# Patient Record
Sex: FEMALE | Race: WHITE | NOT HISPANIC OR LATINO | Employment: OTHER | URBAN - NONMETROPOLITAN AREA
[De-identification: names, ages, dates, MRNs, and addresses within clinical notes are randomized per-mention and may not be internally consistent; named-entity substitution may affect disease eponyms.]

---

## 2017-01-30 ENCOUNTER — TELEPHONE (OUTPATIENT)
Dept: FAMILY MEDICINE CLINIC | Facility: CLINIC | Age: 72
End: 2017-01-30

## 2017-01-30 NOTE — TELEPHONE ENCOUNTER
Patient left a voice message on the prescription refill line, requesting refills on 3 medication but didn't leave the names of med just the pharmacy refill numbers. Have tried returning patients call but received no answer, will try again.

## 2017-01-31 RX ORDER — ATORVASTATIN CALCIUM 20 MG/1
20 TABLET, FILM COATED ORAL NIGHTLY
Qty: 30 TABLET | Refills: 0 | Status: SHIPPED | OUTPATIENT
Start: 2017-01-31 | End: 2017-03-06 | Stop reason: SDUPTHER

## 2017-01-31 RX ORDER — MELOXICAM 15 MG/1
15 TABLET ORAL DAILY
Qty: 30 TABLET | Refills: 0 | Status: SHIPPED | OUTPATIENT
Start: 2017-01-31 | End: 2017-03-06 | Stop reason: SDUPTHER

## 2017-01-31 RX ORDER — GABAPENTIN 300 MG/1
300 CAPSULE ORAL 3 TIMES DAILY
Qty: 90 CAPSULE | Refills: 0 | Status: SHIPPED | OUTPATIENT
Start: 2017-01-31 | End: 2017-03-06 | Stop reason: SDUPTHER

## 2017-02-28 ENCOUNTER — LAB (OUTPATIENT)
Dept: LAB | Facility: OTHER | Age: 72
End: 2017-02-28

## 2017-02-28 DIAGNOSIS — E78.5 HYPERLIPIDEMIA: ICD-10-CM

## 2017-02-28 LAB
ALBUMIN SERPL-MCNC: 4 G/DL (ref 3.2–5.5)
ALBUMIN/GLOB SERPL: 1.3 G/DL (ref 1–3)
ALP SERPL-CCNC: 56 U/L (ref 15–121)
ALT SERPL W P-5'-P-CCNC: 14 U/L (ref 10–60)
ANION GAP SERPL CALCULATED.3IONS-SCNC: 7 MMOL/L (ref 5–15)
AST SERPL-CCNC: 19 U/L (ref 10–60)
BACTERIA UR QL AUTO: ABNORMAL /HPF
BASOPHILS # BLD AUTO: 0.07 10*3/MM3 (ref 0–0.2)
BASOPHILS NFR BLD AUTO: 1.1 % (ref 0–2)
BILIRUB SERPL-MCNC: 1 MG/DL (ref 0.2–1)
BILIRUB UR QL STRIP: NEGATIVE
BUN BLD-MCNC: 22 MG/DL (ref 8–25)
BUN/CREAT SERPL: 31.4 (ref 7–25)
CALCIUM SPEC-SCNC: 9.8 MG/DL (ref 8.4–10.8)
CHLORIDE SERPL-SCNC: 108 MMOL/L (ref 100–112)
CHOLEST SERPL-MCNC: 163 MG/DL (ref 150–200)
CLARITY UR: ABNORMAL
CO2 SERPL-SCNC: 28 MMOL/L (ref 20–32)
COLOR UR: YELLOW
CREAT BLD-MCNC: 0.7 MG/DL (ref 0.4–1.3)
DEPRECATED RDW RBC AUTO: 40.9 FL (ref 36.4–46.3)
EOSINOPHIL # BLD AUTO: 0.24 10*3/MM3 (ref 0–0.7)
EOSINOPHIL NFR BLD AUTO: 3.7 % (ref 0–7)
ERYTHROCYTE [DISTWIDTH] IN BLOOD BY AUTOMATED COUNT: 13.4 % (ref 11.5–14.5)
GFR SERPL CREATININE-BSD FRML MDRD: 82 ML/MIN/1.73 (ref 39–90)
GLOBULIN UR ELPH-MCNC: 3.2 GM/DL (ref 2.5–4.6)
GLUCOSE BLD-MCNC: 98 MG/DL (ref 70–100)
GLUCOSE UR STRIP-MCNC: NEGATIVE MG/DL
HCT VFR BLD AUTO: 41.7 % (ref 35–45)
HDLC SERPL-MCNC: 55 MG/DL (ref 35–100)
HGB BLD-MCNC: 13.3 G/DL (ref 12–15.5)
HGB UR QL STRIP.AUTO: ABNORMAL
HYALINE CASTS UR QL AUTO: ABNORMAL /LPF
KETONES UR QL STRIP: ABNORMAL
LDLC SERPL CALC-MCNC: 90 MG/DL
LDLC/HDLC SERPL: 1.63 {RATIO}
LEUKOCYTE ESTERASE UR QL STRIP.AUTO: ABNORMAL
LYMPHOCYTES # BLD AUTO: 1.57 10*3/MM3 (ref 0.6–4.2)
LYMPHOCYTES NFR BLD AUTO: 23.9 % (ref 10–50)
MCH RBC QN AUTO: 27.1 PG (ref 26.5–34)
MCHC RBC AUTO-ENTMCNC: 31.9 G/DL (ref 31.4–36)
MCV RBC AUTO: 84.9 FL (ref 80–98)
MONOCYTES # BLD AUTO: 0.57 10*3/MM3 (ref 0–0.9)
MONOCYTES NFR BLD AUTO: 8.7 % (ref 0–12)
MUCOUS THREADS URNS QL MICRO: ABNORMAL /HPF
NEUTROPHILS # BLD AUTO: 4.12 10*3/MM3 (ref 2–8.6)
NEUTROPHILS NFR BLD AUTO: 62.6 % (ref 37–80)
NITRITE UR QL STRIP: NEGATIVE
PH UR STRIP.AUTO: 5.5 [PH] (ref 5.5–8)
PLATELET # BLD AUTO: 223 10*3/MM3 (ref 150–450)
PMV BLD AUTO: 11.4 FL (ref 8–12)
POTASSIUM BLD-SCNC: 4.3 MMOL/L (ref 3.4–5.4)
PROT SERPL-MCNC: 7.2 G/DL (ref 6.7–8.2)
PROT UR QL STRIP: ABNORMAL
RBC # BLD AUTO: 4.91 10*6/MM3 (ref 3.77–5.16)
RBC # UR: ABNORMAL /HPF
REF LAB TEST METHOD: ABNORMAL
SODIUM BLD-SCNC: 143 MMOL/L (ref 134–146)
SP GR UR STRIP: >=1.03 (ref 1–1.03)
SQUAMOUS #/AREA URNS HPF: ABNORMAL /HPF
TRIGL SERPL-MCNC: 92 MG/DL (ref 35–160)
UROBILINOGEN UR QL STRIP: ABNORMAL
VLDLC SERPL-MCNC: 18.4 MG/DL
WBC NRBC COR # BLD: 6.57 10*3/MM3 (ref 3.2–9.8)
WBC UR QL AUTO: ABNORMAL /HPF

## 2017-02-28 PROCEDURE — 36415 COLL VENOUS BLD VENIPUNCTURE: CPT | Performed by: INTERNAL MEDICINE

## 2017-02-28 PROCEDURE — 80053 COMPREHEN METABOLIC PANEL: CPT | Performed by: INTERNAL MEDICINE

## 2017-02-28 PROCEDURE — 80061 LIPID PANEL: CPT | Performed by: INTERNAL MEDICINE

## 2017-02-28 PROCEDURE — 84443 ASSAY THYROID STIM HORMONE: CPT | Performed by: INTERNAL MEDICINE

## 2017-02-28 PROCEDURE — 87086 URINE CULTURE/COLONY COUNT: CPT | Performed by: INTERNAL MEDICINE

## 2017-02-28 PROCEDURE — 84439 ASSAY OF FREE THYROXINE: CPT | Performed by: INTERNAL MEDICINE

## 2017-02-28 PROCEDURE — 85025 COMPLETE CBC W/AUTO DIFF WBC: CPT | Performed by: INTERNAL MEDICINE

## 2017-02-28 PROCEDURE — 81001 URINALYSIS AUTO W/SCOPE: CPT | Performed by: INTERNAL MEDICINE

## 2017-03-01 LAB
T4 FREE SERPL-MCNC: 1.18 NG/DL (ref 0.78–2.19)
TSH SERPL DL<=0.05 MIU/L-ACNC: 1.74 MIU/ML (ref 0.46–4.68)

## 2017-03-02 LAB — BACTERIA SPEC AEROBE CULT: NORMAL

## 2017-03-06 ENCOUNTER — OFFICE VISIT (OUTPATIENT)
Dept: FAMILY MEDICINE CLINIC | Facility: CLINIC | Age: 72
End: 2017-03-06

## 2017-03-06 VITALS
TEMPERATURE: 98.4 F | DIASTOLIC BLOOD PRESSURE: 88 MMHG | BODY MASS INDEX: 27 KG/M2 | HEART RATE: 88 BPM | WEIGHT: 168 LBS | HEIGHT: 66 IN | SYSTOLIC BLOOD PRESSURE: 136 MMHG

## 2017-03-06 DIAGNOSIS — E78.2 MIXED HYPERLIPIDEMIA: Primary | Chronic | ICD-10-CM

## 2017-03-06 DIAGNOSIS — M15.9 PRIMARY OSTEOARTHRITIS INVOLVING MULTIPLE JOINTS: Chronic | ICD-10-CM

## 2017-03-06 DIAGNOSIS — G90.9 PERIPHERAL AUTONOMIC NEUROPATHY: Chronic | ICD-10-CM

## 2017-03-06 PROCEDURE — 99214 OFFICE O/P EST MOD 30 MIN: CPT | Performed by: INTERNAL MEDICINE

## 2017-03-06 RX ORDER — GABAPENTIN 300 MG/1
300 CAPSULE ORAL 3 TIMES DAILY
Qty: 90 CAPSULE | Refills: 5 | Status: SHIPPED | OUTPATIENT
Start: 2017-03-06 | End: 2017-05-12 | Stop reason: SDUPTHER

## 2017-03-06 RX ORDER — MELOXICAM 15 MG/1
15 TABLET ORAL DAILY
Qty: 30 TABLET | Refills: 5 | Status: SHIPPED | OUTPATIENT
Start: 2017-03-06 | End: 2017-05-12 | Stop reason: SDUPTHER

## 2017-03-06 RX ORDER — ATORVASTATIN CALCIUM 20 MG/1
20 TABLET, FILM COATED ORAL NIGHTLY
Qty: 30 TABLET | Refills: 5 | Status: SHIPPED | OUTPATIENT
Start: 2017-03-06 | End: 2017-05-12 | Stop reason: SDUPTHER

## 2017-03-06 NOTE — PROGRESS NOTES
Subjective   Eusebia Braden is a 71 y.o. female who presents to the office for follow-up and review of labs. She has hyperlipidemia and takes Lipitor daily.  She has osteoarthritis which causes low back pain and takes meloxicam daily.  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  The numbness and tingling radiates into the lower extremities bilaterally.       History of Present Illness     The following portions of the patient's history were reviewed and updated as appropriate: allergies, current medications, past family history, past medical history, past social history, past surgical history and problem list.    Review of Systems   Constitutional: Negative for chills, fatigue and fever.   HENT: Negative for congestion, sneezing, sore throat and trouble swallowing.    Eyes: Negative for visual disturbance.   Respiratory: Negative for cough, chest tightness, shortness of breath and wheezing.    Cardiovascular: Negative for chest pain, palpitations and leg swelling.   Gastrointestinal: Negative for abdominal pain, constipation, diarrhea, nausea and vomiting.   Genitourinary: Negative for dysuria, frequency and urgency.   Musculoskeletal: Negative for neck pain.   Skin: Negative for rash.   Neurological: Negative for dizziness, weakness and headaches.   Psychiatric/Behavioral:        Patient denies any feelings of depression and has not felt down, hopeless or lost interest in any activities.       Objective   Physical Exam   Constitutional: She is oriented to person, place, and time. She appears well-developed and well-nourished.   HENT:   Head: Normocephalic and atraumatic.   Nose: Nose normal.   Mouth/Throat: Oropharynx is clear and moist. No oropharyngeal exudate.   Eyes: Conjunctivae and EOM are normal. Pupils are equal, round, and reactive to light. Right eye exhibits no discharge. Left eye exhibits no discharge. No scleral icterus.   Neck: Normal range of  motion. Neck supple. No thyromegaly present.   Cardiovascular: Normal rate, regular rhythm, normal heart sounds and intact distal pulses.  Exam reveals no gallop and no friction rub.    No murmur heard.  Pulmonary/Chest: Effort normal and breath sounds normal. No respiratory distress. She has no wheezes. She has no rales.   Abdominal: Soft. Bowel sounds are normal. She exhibits no distension. There is no tenderness. There is no rebound and no guarding.   Musculoskeletal: She exhibits no edema.        Lumbar back: She exhibits pain.   Lymphadenopathy:     She has no cervical adenopathy.   Neurological: She is alert and oriented to person, place, and time. No cranial nerve deficit.   Skin: Skin is warm and dry. No rash noted.   Psychiatric: She has a normal mood and affect. Her behavior is normal. Judgment and thought content normal.   Nursing note and vitals reviewed.      Assessment/Plan   Eusebia was seen today for hyperlipidemia.    Diagnoses and all orders for this visit:    Mixed hyperlipidemia  -     CBC & Differential; Future  -     Comprehensive Metabolic Panel; Future  -     LDL Cholesterol, Direct; Future  -     T4, Free; Future  -     TSH; Future  -     Urinalysis With / Culture If Indicated; Future  -     atorvastatin (LIPITOR) 20 MG tablet; Take 1 tablet by mouth Every Night.    Peripheral autonomic neuropathy  -     gabapentin (NEURONTIN) 300 MG capsule; Take 1 capsule by mouth 3 (Three) Times a Day.    Primary osteoarthritis involving multiple joints  -     meloxicam (MOBIC) 15 MG tablet; Take 1 tablet by mouth Daily.         Labs are reviewed with patient. Her lipids are at goal and she will continue with Lipitor.  She will continue with meloxicam for treatment of the osteoarthritis.  She will continue with Neurontin for treatment of the neuropathic pain.  She may use Claritin over-the-counter for the allergic rhinitis.    Patients BMI indicates that she is overweight.  I discussed the importance of  weight loss, and have recommended a diet and exercise regimen.      PHQ-9 Depression Screening 3/6/2017   Little interest or pleasure in doing things 0   Feeling down, depressed, or hopeless 1   Trouble falling or staying asleep, or sleeping too much 1   Feeling tired or having little energy 1   Poor appetite or overeating 1   Feeling bad about yourself - or that you are a failure or have let yourself or your family down 0   Trouble concentrating on things, such as reading the newspaper or watching television 0   Moving or speaking so slowly that other people could have noticed. Or the opposite - being so fidgety or restless that you have been moving around a lot more than usual 0   Thoughts that you would be better off dead, or of hurting yourself in some way 0   PHQ-9 Total Score 4   If you checked off any problems, how difficult have these problems made it for you to do your work, take care of things at home, or get along with other people? Not difficult at all         Lab on 02/28/2017   Component Date Value Ref Range Status   • Glucose 02/28/2017 98  70 - 100 mg/dL Final   • BUN 02/28/2017 22  8 - 25 mg/dL Final   • Creatinine 02/28/2017 0.70  0.40 - 1.30 mg/dL Final   • Sodium 02/28/2017 143  134 - 146 mmol/L Final   • Potassium 02/28/2017 4.3  3.4 - 5.4 mmol/L Final   • Chloride 02/28/2017 108  100 - 112 mmol/L Final   • CO2 02/28/2017 28.0  20.0 - 32.0 mmol/L Final   • Calcium 02/28/2017 9.8  8.4 - 10.8 mg/dL Final   • Total Protein 02/28/2017 7.2  6.7 - 8.2 g/dL Final   • Albumin 02/28/2017 4.00  3.20 - 5.50 g/dL Final   • ALT (SGPT) 02/28/2017 14  10 - 60 U/L Final   • AST (SGOT) 02/28/2017 19  10 - 60 U/L Final   • Alkaline Phosphatase 02/28/2017 56  15 - 121 U/L Final   • Total Bilirubin 02/28/2017 1.0  0.2 - 1.0 mg/dL Final   • eGFR Non African Amer 02/28/2017 82  39 - 90 mL/min/1.73 Final   • Globulin 02/28/2017 3.2  2.5 - 4.6 gm/dL Final   • A/G Ratio 02/28/2017 1.3  1.0 - 3.0 g/dL Final   •  BUN/Creatinine Ratio 02/28/2017 31.4* 7.0 - 25.0 Final   • Anion Gap 02/28/2017 7.0  5.0 - 15.0 mmol/L Final   • Total Cholesterol 02/28/2017 163  150 - 200 mg/dL Final   • Triglycerides 02/28/2017 92  35 - 160 mg/dL Final   • HDL Cholesterol 02/28/2017 55  35 - 100 mg/dL Final   • LDL Cholesterol  02/28/2017 90  mg/dL Final   • VLDL Cholesterol 02/28/2017 18.4  mg/dL Final   • LDL/HDL Ratio 02/28/2017 1.63   Final   • Free T4 02/28/2017 1.18  0.78 - 2.19 ng/dL Final   • TSH 02/28/2017 1.740  0.460 - 4.680 mIU/mL Final   • Color, UA 02/28/2017 Yellow  Yellow, Straw Final   • Appearance, UA 02/28/2017 Cloudy* Clear Final   • pH, UA 02/28/2017 5.5  5.5 - 8.0 Final   • Specific Gravity, UA 02/28/2017 >=1.030  1.005 - 1.030 Final   • Glucose, UA 02/28/2017 Negative  Negative Final   • Ketones, UA 02/28/2017 Trace* Negative Final   • Bilirubin, UA 02/28/2017 Negative  Negative Final   • Blood, UA 02/28/2017 Trace* Negative Final   • Protein, UA 02/28/2017 30 mg/dL (1+)* Negative Final   • Leuk Esterase, UA 02/28/2017 Moderate (2+)* Negative Final   • Nitrite, UA 02/28/2017 Negative  Negative Final   • Urobilinogen, UA 02/28/2017 0.2 E.U./dL  0.2 - 1.0 E.U./dL Final   • WBC 02/28/2017 6.57  3.20 - 9.80 10*3/mm3 Final   • RBC 02/28/2017 4.91  3.77 - 5.16 10*6/mm3 Final   • Hemoglobin 02/28/2017 13.3  12.0 - 15.5 g/dL Final   • Hematocrit 02/28/2017 41.7  35.0 - 45.0 % Final   • MCV 02/28/2017 84.9  80.0 - 98.0 fL Final   • MCH 02/28/2017 27.1  26.5 - 34.0 pg Final   • MCHC 02/28/2017 31.9  31.4 - 36.0 g/dL Final   • RDW 02/28/2017 13.4  11.5 - 14.5 % Final   • RDW-SD 02/28/2017 40.9  36.4 - 46.3 fl Final   • MPV 02/28/2017 11.4  8.0 - 12.0 fL Final   • Platelets 02/28/2017 223  150 - 450 10*3/mm3 Final   • Neutrophil % 02/28/2017 62.6  37.0 - 80.0 % Final   • Lymphocyte % 02/28/2017 23.9  10.0 - 50.0 % Final   • Monocyte % 02/28/2017 8.7  0.0 - 12.0 % Final   • Eosinophil % 02/28/2017 3.7  0.0 - 7.0 % Final   • Basophil %  02/28/2017 1.1  0.0 - 2.0 % Final   • Neutrophils, Absolute 02/28/2017 4.12  2.00 - 8.60 10*3/mm3 Final   • Lymphocytes, Absolute 02/28/2017 1.57  0.60 - 4.20 10*3/mm3 Final   • Monocytes, Absolute 02/28/2017 0.57  0.00 - 0.90 10*3/mm3 Final   • Eosinophils, Absolute 02/28/2017 0.24  0.00 - 0.70 10*3/mm3 Final   • Basophils, Absolute 02/28/2017 0.07  0.00 - 0.20 10*3/mm3 Final   • RBC, UA 02/28/2017 3-5* None Seen /HPF Final   • WBC, UA 02/28/2017 Too Numerous to Count* None Seen /HPF Final   • Bacteria, UA 02/28/2017 2+* None Seen /HPF Final   • Squamous Epithelial Cells, UA 02/28/2017 21-30* None Seen, 0-2 /HPF Final   • Hyaline Casts, UA 02/28/2017 None Seen  None Seen /LPF Final   • Mucus, UA 02/28/2017 Moderate/2+* None Seen, Trace /HPF Final   • Methodology 02/28/2017 Manual Light Microscopy   Final   • Urine Culture 02/28/2017 No growth at 24 hours   Final   ]

## 2017-03-06 NOTE — PATIENT INSTRUCTIONS

## 2017-05-12 DIAGNOSIS — M15.9 PRIMARY OSTEOARTHRITIS INVOLVING MULTIPLE JOINTS: Chronic | ICD-10-CM

## 2017-05-12 DIAGNOSIS — G90.9 PERIPHERAL AUTONOMIC NEUROPATHY: Chronic | ICD-10-CM

## 2017-05-12 DIAGNOSIS — E78.2 MIXED HYPERLIPIDEMIA: Chronic | ICD-10-CM

## 2017-05-12 RX ORDER — MELOXICAM 15 MG/1
15 TABLET ORAL DAILY
Qty: 90 TABLET | Refills: 3 | Status: SHIPPED | OUTPATIENT
Start: 2017-05-12 | End: 2018-03-13 | Stop reason: SDUPTHER

## 2017-05-12 RX ORDER — GABAPENTIN 300 MG/1
300 CAPSULE ORAL 3 TIMES DAILY
Qty: 270 CAPSULE | Refills: 3 | Status: SHIPPED | OUTPATIENT
Start: 2017-05-12 | End: 2017-09-12

## 2017-05-12 RX ORDER — ATORVASTATIN CALCIUM 20 MG/1
20 TABLET, FILM COATED ORAL NIGHTLY
Qty: 90 TABLET | Refills: 3 | Status: SHIPPED | OUTPATIENT
Start: 2017-05-12 | End: 2018-03-13 | Stop reason: SDUPTHER

## 2017-05-31 ENCOUNTER — OFFICE VISIT (OUTPATIENT)
Dept: FAMILY MEDICINE CLINIC | Facility: CLINIC | Age: 72
End: 2017-05-31

## 2017-05-31 VITALS
TEMPERATURE: 97.8 F | BODY MASS INDEX: 25.71 KG/M2 | DIASTOLIC BLOOD PRESSURE: 82 MMHG | RESPIRATION RATE: 17 BRPM | OXYGEN SATURATION: 95 % | SYSTOLIC BLOOD PRESSURE: 132 MMHG | WEIGHT: 160 LBS | HEIGHT: 66 IN | HEART RATE: 71 BPM
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
## 2017-05-31 DIAGNOSIS — M25.532 WRIST PAIN, LEFT: ICD-10-CM

## 2017-05-31 DIAGNOSIS — M25.532 LEFT WRIST PAIN: Primary | ICD-10-CM

## 2017-05-31 PROCEDURE — 99214 OFFICE O/P EST MOD 30 MIN: CPT | Performed by: NURSE PRACTITIONER

## 2017-09-07 ENCOUNTER — LAB (OUTPATIENT)
Dept: LAB | Facility: OTHER | Age: 72
End: 2017-09-07

## 2017-09-07 DIAGNOSIS — E78.2 MIXED HYPERLIPIDEMIA: ICD-10-CM

## 2017-09-07 LAB
ALBUMIN SERPL-MCNC: 3.9 G/DL (ref 3.2–5.5)
ALBUMIN/GLOB SERPL: 1.3 G/DL (ref 1–3)
ALP SERPL-CCNC: 59 U/L (ref 15–121)
ALT SERPL W P-5'-P-CCNC: 11 U/L (ref 10–60)
ANION GAP SERPL CALCULATED.3IONS-SCNC: 9 MMOL/L (ref 5–15)
ARTICHOKE IGE QN: 84 MG/DL (ref 0–129)
AST SERPL-CCNC: 18 U/L (ref 10–60)
BACTERIA UR QL AUTO: ABNORMAL /HPF
BASOPHILS # BLD AUTO: 0.04 10*3/MM3 (ref 0–0.2)
BASOPHILS NFR BLD AUTO: 0.5 % (ref 0–2)
BILIRUB SERPL-MCNC: 1.1 MG/DL (ref 0.2–1)
BILIRUB UR QL STRIP: NEGATIVE
BUN BLD-MCNC: 18 MG/DL (ref 8–25)
BUN/CREAT SERPL: 25.7 (ref 7–25)
CALCIUM SPEC-SCNC: 9.4 MG/DL (ref 8.4–10.8)
CHLORIDE SERPL-SCNC: 104 MMOL/L (ref 100–112)
CLARITY UR: CLEAR
CO2 SERPL-SCNC: 28 MMOL/L (ref 20–32)
COLOR UR: YELLOW
CREAT BLD-MCNC: 0.7 MG/DL (ref 0.4–1.3)
DEPRECATED RDW RBC AUTO: 41 FL (ref 36.4–46.3)
EOSINOPHIL # BLD AUTO: 0.23 10*3/MM3 (ref 0–0.7)
EOSINOPHIL NFR BLD AUTO: 3 % (ref 0–7)
ERYTHROCYTE [DISTWIDTH] IN BLOOD BY AUTOMATED COUNT: 13.2 % (ref 11.5–14.5)
GFR SERPL CREATININE-BSD FRML MDRD: 82 ML/MIN/1.73 (ref 39–90)
GLOBULIN UR ELPH-MCNC: 3.1 GM/DL (ref 2.5–4.6)
GLUCOSE BLD-MCNC: 104 MG/DL (ref 70–100)
GLUCOSE UR STRIP-MCNC: NEGATIVE MG/DL
HCT VFR BLD AUTO: 41 % (ref 35–45)
HGB BLD-MCNC: 13.1 G/DL (ref 12–15.5)
HGB UR QL STRIP.AUTO: NEGATIVE
HYALINE CASTS UR QL AUTO: ABNORMAL /LPF
KETONES UR QL STRIP: NEGATIVE
LEUKOCYTE ESTERASE UR QL STRIP.AUTO: ABNORMAL
LYMPHOCYTES # BLD AUTO: 1.63 10*3/MM3 (ref 0.6–4.2)
LYMPHOCYTES NFR BLD AUTO: 21.6 % (ref 10–50)
MCH RBC QN AUTO: 27.5 PG (ref 26.5–34)
MCHC RBC AUTO-ENTMCNC: 32 G/DL (ref 31.4–36)
MCV RBC AUTO: 86.1 FL (ref 80–98)
MONOCYTES # BLD AUTO: 0.63 10*3/MM3 (ref 0–0.9)
MONOCYTES NFR BLD AUTO: 8.3 % (ref 0–12)
NEUTROPHILS # BLD AUTO: 5.03 10*3/MM3 (ref 2–8.6)
NEUTROPHILS NFR BLD AUTO: 66.6 % (ref 37–80)
NITRITE UR QL STRIP: NEGATIVE
PH UR STRIP.AUTO: 7 [PH] (ref 5.5–8)
PLATELET # BLD AUTO: 227 10*3/MM3 (ref 150–450)
PMV BLD AUTO: 10.7 FL (ref 8–12)
POTASSIUM BLD-SCNC: 5 MMOL/L (ref 3.4–5.4)
PROT SERPL-MCNC: 7 G/DL (ref 6.7–8.2)
PROT UR QL STRIP: ABNORMAL
RBC # BLD AUTO: 4.76 10*6/MM3 (ref 3.77–5.16)
RBC # UR: ABNORMAL /HPF
REF LAB TEST METHOD: ABNORMAL
SODIUM BLD-SCNC: 141 MMOL/L (ref 134–146)
SP GR UR STRIP: 1.02 (ref 1–1.03)
SQUAMOUS #/AREA URNS HPF: ABNORMAL /HPF
T4 FREE SERPL-MCNC: 1.01 NG/DL (ref 0.78–2.19)
TSH SERPL DL<=0.05 MIU/L-ACNC: 1.42 MIU/ML (ref 0.46–4.68)
UROBILINOGEN UR QL STRIP: ABNORMAL
WBC NRBC COR # BLD: 7.56 10*3/MM3 (ref 3.2–9.8)
WBC UR QL AUTO: ABNORMAL /HPF

## 2017-09-07 PROCEDURE — 87086 URINE CULTURE/COLONY COUNT: CPT | Performed by: INTERNAL MEDICINE

## 2017-09-07 PROCEDURE — 84443 ASSAY THYROID STIM HORMONE: CPT | Performed by: INTERNAL MEDICINE

## 2017-09-07 PROCEDURE — 81001 URINALYSIS AUTO W/SCOPE: CPT | Performed by: INTERNAL MEDICINE

## 2017-09-07 PROCEDURE — 83721 ASSAY OF BLOOD LIPOPROTEIN: CPT | Performed by: INTERNAL MEDICINE

## 2017-09-07 PROCEDURE — 85025 COMPLETE CBC W/AUTO DIFF WBC: CPT | Performed by: INTERNAL MEDICINE

## 2017-09-07 PROCEDURE — 80053 COMPREHEN METABOLIC PANEL: CPT | Performed by: INTERNAL MEDICINE

## 2017-09-07 PROCEDURE — 84439 ASSAY OF FREE THYROXINE: CPT | Performed by: INTERNAL MEDICINE

## 2017-09-07 PROCEDURE — 36415 COLL VENOUS BLD VENIPUNCTURE: CPT | Performed by: INTERNAL MEDICINE

## 2017-09-08 LAB
BACTERIA SPEC AEROBE CULT: NORMAL
BACTERIA SPEC AEROBE CULT: NORMAL

## 2017-09-12 ENCOUNTER — OFFICE VISIT (OUTPATIENT)
Dept: FAMILY MEDICINE CLINIC | Facility: CLINIC | Age: 72
End: 2017-09-12

## 2017-09-12 VITALS
HEIGHT: 66 IN | DIASTOLIC BLOOD PRESSURE: 80 MMHG | WEIGHT: 158 LBS | HEART RATE: 70 BPM | BODY MASS INDEX: 25.39 KG/M2 | SYSTOLIC BLOOD PRESSURE: 120 MMHG

## 2017-09-12 DIAGNOSIS — Z23 PNEUMOCOCCAL VACCINATION GIVEN: ICD-10-CM

## 2017-09-12 DIAGNOSIS — E78.2 MIXED HYPERLIPIDEMIA: Primary | Chronic | ICD-10-CM

## 2017-09-12 DIAGNOSIS — G90.9 PERIPHERAL AUTONOMIC NEUROPATHY: Chronic | ICD-10-CM

## 2017-09-12 DIAGNOSIS — M15.9 PRIMARY OSTEOARTHRITIS INVOLVING MULTIPLE JOINTS: Chronic | ICD-10-CM
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
## 2017-09-12 PROBLEM — M25.532 WRIST PAIN, LEFT: Status: RESOLVED | Noted: 2017-05-31 | Resolved: 2017-09-12

## 2017-09-12 PROCEDURE — 99214 OFFICE O/P EST MOD 30 MIN: CPT | Performed by: INTERNAL MEDICINE

## 2017-09-12 PROCEDURE — 90670 PCV13 VACCINE IM: CPT | Performed by: INTERNAL MEDICINE

## 2017-09-12 PROCEDURE — G0009 ADMIN PNEUMOCOCCAL VACCINE: HCPCS | Performed by: INTERNAL MEDICINE

## 2017-09-12 RX ORDER — GABAPENTIN 300 MG/1
300 CAPSULE ORAL 4 TIMES DAILY
Qty: 120 CAPSULE | Refills: 0
Start: 2017-09-12 | End: 2017-11-20 | Stop reason: SDUPTHER

## 2017-09-12 NOTE — PROGRESS NOTES
[facility]#   [id number]                        .

## 2017-09-12 NOTE — PROGRESS NOTES
"Chief Complaint   Patient presents with   • Hyperlipidemia   • Back Pain     rad to left leg     Subjective   Eusebia Braden is a 72 y.o. female who presents to the office for follow-up and review of labs. he has hyperlipidemia and takes Lipitor daily.  She has osteoarthritis which causes low back pain and takes meloxicam daily.  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  The numbness and tingling radiates into the lower extremities bilaterally. This has been stable. When she starts having increasing numbness and pain in her hips, she stands up and walks.  This typically helps quite a bit.       The following portions of the patient's history were reviewed and updated as appropriate: allergies, current medications, past family history, past medical history, past social history, past surgical history and problem list.    Review of Systems   Constitutional: Negative for chills, fatigue and fever.   HENT: Negative for congestion, sneezing, sore throat and trouble swallowing.    Eyes: Negative for visual disturbance.   Respiratory: Negative for cough, chest tightness, shortness of breath and wheezing.    Cardiovascular: Negative for chest pain, palpitations and leg swelling.   Gastrointestinal: Negative for abdominal pain, constipation, diarrhea, nausea and vomiting.   Genitourinary: Negative for dysuria, frequency and urgency.   Musculoskeletal: Positive for arthralgias and back pain. Negative for neck pain.   Skin: Negative for rash.   Neurological: Negative for dizziness, weakness and headaches.   Psychiatric/Behavioral:        Patient denies any feelings of depression and has not felt down, hopeless or lost interest in any activities.       Objective   Vitals:    09/12/17 1041   BP: 120/80   BP Location: Left arm   Patient Position: Sitting   Cuff Size: Adult   Pulse: 70   Weight: 158 lb (71.7 kg)   Height: 66\" (167.6 cm)   PainSc: 5  Comment: bilat knees and " episodes of  back pain   PainLoc: Knee     Physical Exam   Constitutional: She is oriented to person, place, and time. She appears well-developed and well-nourished.   HENT:   Head: Normocephalic and atraumatic.   Nose: Nose normal.   Mouth/Throat: Oropharynx is clear and moist. No oropharyngeal exudate.   Eyes: Conjunctivae and EOM are normal. Pupils are equal, round, and reactive to light. Right eye exhibits no discharge. Left eye exhibits no discharge. No scleral icterus.   Neck: Normal range of motion. Neck supple. No thyromegaly present.   Cardiovascular: Normal rate, regular rhythm, normal heart sounds and intact distal pulses.  Exam reveals no gallop and no friction rub.    No murmur heard.  Pulmonary/Chest: Effort normal and breath sounds normal. No respiratory distress. She has no wheezes. She has no rales.   Abdominal: Soft. Bowel sounds are normal. She exhibits no distension. There is no tenderness. There is no rebound and no guarding.   Musculoskeletal: She exhibits no edema.        Lumbar back: She exhibits pain.   Lymphadenopathy:     She has no cervical adenopathy.   Neurological: She is alert and oriented to person, place, and time. No cranial nerve deficit.   Skin: Skin is warm and dry. No rash noted.   Psychiatric: She has a normal mood and affect. Her behavior is normal. Judgment and thought content normal.   Nursing note and vitals reviewed.      Assessment/Plan   Eusebia was seen today for hyperlipidemia and back pain.    Diagnoses and all orders for this visit:    Mixed hyperlipidemia  -     CBC & Differential; Future  -     Comprehensive Metabolic Panel; Future  -     Lipid Panel; Future  -     T4, Free; Future  -     TSH; Future  -     Urinalysis With / Culture If Indicated; Future    Primary osteoarthritis involving multiple joints    Peripheral autonomic neuropathy  -     gabapentin (NEURONTIN) 300 MG capsule; Take 1 capsule by mouth 4 (Four) Times a Day.    Pneumococcal vaccination given  -      Pneumococcal Conjugate Vaccine 13-Valent All         Labs are reviewed with patient.  Her lipids are at goal and she will continue with Lipitor.  She will continue with meloxicam for treatment of the osteoarthritis.  She will continue with Neurontin for treatment of the neuropathic pain.  She is currently taking 300 mg of Neurontin in the morning and afternoon, and 600 mg at bedtime.    She is given a pneumonia vaccine today.  She does not want a flu shot.     Patient understands the risks associated with this controlled medication, including tolerance and addiction.  Patient also agrees to only obtain this medication from me, and not from a another provider, unless that provider is covering for me in my absence.  Patient also agrees to be compliant in dosing, and not self adjust the dose of medication.  A signed controlled substance agreement is on file, and the patient has received a controlled substance education sheet at this a previous visit.  The patient has also signed a consent for treatment with a controlled substance as per [facility] policy. KESHAWN was obtained.    Depression screen was performed with a score of 12.  She denies any symptoms of depression.  Her positive scores are mostly related to her arthritic pain.    PHQ-2/PHQ-9 Depression Screening 9/12/2017   Little interest or pleasure in doing things 1   Feeling down, depressed, or hopeless 1   Trouble falling or staying asleep, or sleeping too much 2   Feeling tired or having little energy 2   Poor appetite or overeating 1   Feeling bad about yourself - or that you are a failure or have let yourself or your family down 1   Trouble concentrating on things, such as reading the newspaper or watching television 1   Moving or speaking so slowly that other people could have noticed. Or the opposite - being so fidgety or restless that you have been moving around a lot more than usual 2   Thoughts that you would be better off dead, or of hurting  yourself in some way 1   Total Score 12   If you checked off any problems, how difficult have these problems made it for you to do your work, take care of things at home, or get along with other people? Somewhat difficult         Lab on 09/07/2017   Component Date Value Ref Range Status   • Glucose 09/07/2017 104* 70 - 100 mg/dL Final   • BUN 09/07/2017 18  8 - 25 mg/dL Final   • Creatinine 09/07/2017 0.70  0.40 - 1.30 mg/dL Final   • Sodium 09/07/2017 141  134 - 146 mmol/L Final   • Potassium 09/07/2017 5.0  3.4 - 5.4 mmol/L Final   • Chloride 09/07/2017 104  100 - 112 mmol/L Final   • CO2 09/07/2017 28.0  20.0 - 32.0 mmol/L Final   • Calcium 09/07/2017 9.4  8.4 - 10.8 mg/dL Final   • Total Protein 09/07/2017 7.0  6.7 - 8.2 g/dL Final   • Albumin 09/07/2017 3.90  3.20 - 5.50 g/dL Final   • ALT (SGPT) 09/07/2017 11  10 - 60 U/L Final   • AST (SGOT) 09/07/2017 18  10 - 60 U/L Final   • Alkaline Phosphatase 09/07/2017 59  15 - 121 U/L Final   • Total Bilirubin 09/07/2017 1.1* 0.2 - 1.0 mg/dL Final   • eGFR Non African Amer 09/07/2017 82  39 - 90 mL/min/1.73 Final   • Globulin 09/07/2017 3.1  2.5 - 4.6 gm/dL Final   • A/G Ratio 09/07/2017 1.3  1.0 - 3.0 g/dL Final   • BUN/Creatinine Ratio 09/07/2017 25.7* 7.0 - 25.0 Final   • Anion Gap 09/07/2017 9.0  5.0 - 15.0 mmol/L Final   • LDL Cholesterol  09/07/2017 84  0 - 129 mg/dL Final   • Free T4 09/07/2017 1.01  0.78 - 2.19 ng/dL Final   • TSH 09/07/2017 1.420  0.460 - 4.680 mIU/mL Final   • Color, UA 09/07/2017 Yellow  Yellow, Straw Final   • Appearance, UA 09/07/2017 Clear  Clear Final   • pH, UA 09/07/2017 7.0  5.5 - 8.0 Final   • Specific Gravity, UA 09/07/2017 1.020  1.005 - 1.030 Final   • Glucose, UA 09/07/2017 Negative  Negative Final   • Ketones, UA 09/07/2017 Negative  Negative Final   • Bilirubin, UA 09/07/2017 Negative  Negative Final   • Blood, UA 09/07/2017 Negative  Negative Final   • Protein, UA 09/07/2017 30 mg/dL (1+)* Negative Final   • Leuk Esterase, UA  09/07/2017 Small (1+)* Negative Final   • Nitrite, UA 09/07/2017 Negative  Negative Final   • Urobilinogen, UA 09/07/2017 1.0 E.U./dL  0.2 - 1.0 E.U./dL Final   • WBC 09/07/2017 7.56  3.20 - 9.80 10*3/mm3 Final   • RBC 09/07/2017 4.76  3.77 - 5.16 10*6/mm3 Final   • Hemoglobin 09/07/2017 13.1  12.0 - 15.5 g/dL Final   • Hematocrit 09/07/2017 41.0  35.0 - 45.0 % Final   • MCV 09/07/2017 86.1  80.0 - 98.0 fL Final   • MCH 09/07/2017 27.5  26.5 - 34.0 pg Final   • MCHC 09/07/2017 32.0  31.4 - 36.0 g/dL Final   • RDW 09/07/2017 13.2  11.5 - 14.5 % Final   • RDW-SD 09/07/2017 41.0  36.4 - 46.3 fl Final   • MPV 09/07/2017 10.7  8.0 - 12.0 fL Final   • Platelets 09/07/2017 227  150 - 450 10*3/mm3 Final   • Neutrophil % 09/07/2017 66.6  37.0 - 80.0 % Final   • Lymphocyte % 09/07/2017 21.6  10.0 - 50.0 % Final   • Monocyte % 09/07/2017 8.3  0.0 - 12.0 % Final   • Eosinophil % 09/07/2017 3.0  0.0 - 7.0 % Final   • Basophil % 09/07/2017 0.5  0.0 - 2.0 % Final   • Neutrophils, Absolute 09/07/2017 5.03  2.00 - 8.60 10*3/mm3 Final   • Lymphocytes, Absolute 09/07/2017 1.63  0.60 - 4.20 10*3/mm3 Final   • Monocytes, Absolute 09/07/2017 0.63  0.00 - 0.90 10*3/mm3 Final   • Eosinophils, Absolute 09/07/2017 0.23  0.00 - 0.70 10*3/mm3 Final   • Basophils, Absolute 09/07/2017 0.04  0.00 - 0.20 10*3/mm3 Final   • RBC, UA 09/07/2017 0-2* None Seen /HPF Final   • WBC, UA 09/07/2017 3-5* None Seen /HPF Final   • Bacteria, UA 09/07/2017 Trace* None Seen /HPF Final   • Squamous Epithelial Cells, UA 09/07/2017 0-2  None Seen, 0-2 /HPF Final   • Hyaline Casts, UA 09/07/2017 0-2  None Seen /LPF Final   • Methodology 09/07/2017 Manual Light Microscopy   Final   • Urine Culture 09/07/2017 Culture in progress   Final   • Urine Culture 09/07/2017 Mixed Culture   Final   ]

## 2017-11-20 DIAGNOSIS — G90.9 PERIPHERAL AUTONOMIC NEUROPATHY: Chronic | ICD-10-CM

## 2017-11-20 RX ORDER — GABAPENTIN 300 MG/1
CAPSULE ORAL
Qty: 270 CAPSULE | Refills: 0 | Status: SHIPPED | OUTPATIENT
Start: 2017-11-20 | End: 2017-11-20 | Stop reason: SDUPTHER

## 2017-11-20 RX ORDER — GABAPENTIN 300 MG/1
CAPSULE ORAL
Qty: 120 CAPSULE | Refills: 2 | Status: SHIPPED | OUTPATIENT
Start: 2017-11-20 | End: 2018-02-27 | Stop reason: SDUPTHER

## 2017-11-20 NOTE — TELEPHONE ENCOUNTER
Shredded script for Neurontin 300 mg tid.  States taking 4 capsules daily as noted on last visit. Will reprint for 4 capsules daily as requested.

## 2018-02-27 ENCOUNTER — TELEPHONE (OUTPATIENT)
Dept: FAMILY MEDICINE CLINIC | Facility: CLINIC | Age: 73
End: 2018-02-27

## 2018-02-27 DIAGNOSIS — G90.9 PERIPHERAL AUTONOMIC NEUROPATHY: Chronic | ICD-10-CM

## 2018-02-27 RX ORDER — GABAPENTIN 300 MG/1
CAPSULE ORAL
Qty: 120 CAPSULE | Refills: 0 | Status: SHIPPED | OUTPATIENT
Start: 2018-02-27 | End: 2018-03-13 | Stop reason: SDUPTHER

## 2018-02-27 NOTE — TELEPHONE ENCOUNTER
Patient was last seen by  on 09/12/2017 due to follow up w/ again on 03/13/2018. Requesting a refill on the Gabapentin 300 MG, to [facility].

## 2018-03-12 ENCOUNTER — LAB (OUTPATIENT)
Dept: LAB | Facility: OTHER | Age: 73
End: 2018-03-12

## 2018-03-12 DIAGNOSIS — E78.2 MIXED HYPERLIPIDEMIA: ICD-10-CM

## 2018-03-12 LAB
ALBUMIN SERPL-MCNC: 4 G/DL (ref 3.2–5.5)
ALBUMIN/GLOB SERPL: 1.5 G/DL (ref 1–3)
ALP SERPL-CCNC: 54 U/L (ref 15–121)
ALT SERPL W P-5'-P-CCNC: 17 U/L (ref 10–60)
ANION GAP SERPL CALCULATED.3IONS-SCNC: 6 MMOL/L (ref 5–15)
AST SERPL-CCNC: 24 U/L (ref 10–60)
BACTERIA UR QL AUTO: ABNORMAL /HPF
BASOPHILS # BLD AUTO: 0.07 10*3/MM3 (ref 0–0.2)
BASOPHILS NFR BLD AUTO: 1.2 % (ref 0–2)
BILIRUB SERPL-MCNC: 0.8 MG/DL (ref 0.2–1)
BILIRUB UR QL STRIP: NEGATIVE
BUN BLD-MCNC: 19 MG/DL (ref 8–25)
BUN/CREAT SERPL: 31.7 (ref 7–25)
CALCIUM SPEC-SCNC: 9.2 MG/DL (ref 8.4–10.8)
CHLORIDE SERPL-SCNC: 107 MMOL/L (ref 100–112)
CHOLEST SERPL-MCNC: 168 MG/DL (ref 150–200)
CLARITY UR: ABNORMAL
CO2 SERPL-SCNC: 28 MMOL/L (ref 20–32)
COLOR UR: YELLOW
CREAT BLD-MCNC: 0.6 MG/DL (ref 0.4–1.3)
DEPRECATED RDW RBC AUTO: 40.8 FL (ref 36.4–46.3)
EOSINOPHIL # BLD AUTO: 0.24 10*3/MM3 (ref 0–0.7)
EOSINOPHIL NFR BLD AUTO: 4 % (ref 0–7)
ERYTHROCYTE [DISTWIDTH] IN BLOOD BY AUTOMATED COUNT: 13.4 % (ref 11.5–14.5)
GFR SERPL CREATININE-BSD FRML MDRD: 98 ML/MIN/1.73 (ref 39–90)
GLOBULIN UR ELPH-MCNC: 2.7 GM/DL (ref 2.5–4.6)
GLUCOSE BLD-MCNC: 96 MG/DL (ref 70–100)
GLUCOSE UR STRIP-MCNC: NEGATIVE MG/DL
HCT VFR BLD AUTO: 40.4 % (ref 35–45)
HDLC SERPL-MCNC: 69 MG/DL (ref 35–100)
HGB BLD-MCNC: 12.8 G/DL (ref 12–15.5)
HGB UR QL STRIP.AUTO: NEGATIVE
HYALINE CASTS UR QL AUTO: ABNORMAL /LPF
KETONES UR QL STRIP: NEGATIVE
LDLC SERPL CALC-MCNC: 80 MG/DL
LDLC/HDLC SERPL: 1.15 {RATIO}
LEUKOCYTE ESTERASE UR QL STRIP.AUTO: ABNORMAL
LYMPHOCYTES # BLD AUTO: 1.67 10*3/MM3 (ref 0.6–4.2)
LYMPHOCYTES NFR BLD AUTO: 28.1 % (ref 10–50)
MCH RBC QN AUTO: 26.8 PG (ref 26.5–34)
MCHC RBC AUTO-ENTMCNC: 31.7 G/DL (ref 31.4–36)
MCV RBC AUTO: 84.7 FL (ref 80–98)
MONOCYTES # BLD AUTO: 0.5 10*3/MM3 (ref 0–0.9)
MONOCYTES NFR BLD AUTO: 8.4 % (ref 0–12)
MUCOUS THREADS URNS QL MICRO: ABNORMAL /HPF
NEUTROPHILS # BLD AUTO: 3.47 10*3/MM3 (ref 2–8.6)
NEUTROPHILS NFR BLD AUTO: 58.3 % (ref 37–80)
NITRITE UR QL STRIP: NEGATIVE
PH UR STRIP.AUTO: 5.5 [PH] (ref 5.5–8)
PLATELET # BLD AUTO: 189 10*3/MM3 (ref 150–450)
PMV BLD AUTO: 12.4 FL (ref 8–12)
POTASSIUM BLD-SCNC: 3.9 MMOL/L (ref 3.4–5.4)
PROT SERPL-MCNC: 6.7 G/DL (ref 6.7–8.2)
PROT UR QL STRIP: NEGATIVE
RBC # BLD AUTO: 4.77 10*6/MM3 (ref 3.77–5.16)
RBC # UR: ABNORMAL /HPF
REF LAB TEST METHOD: ABNORMAL
SODIUM BLD-SCNC: 141 MMOL/L (ref 134–146)
SP GR UR STRIP: >=1.03 (ref 1–1.03)
SQUAMOUS #/AREA URNS HPF: ABNORMAL /HPF
T4 FREE SERPL-MCNC: 1.05 NG/DL (ref 0.78–2.19)
TRIGL SERPL-MCNC: 97 MG/DL (ref 35–160)
TSH SERPL DL<=0.05 MIU/L-ACNC: 2.74 MIU/ML (ref 0.46–4.68)
UROBILINOGEN UR QL STRIP: ABNORMAL
VLDLC SERPL-MCNC: 19.4 MG/DL
WBC NRBC COR # BLD: 5.95 10*3/MM3 (ref 3.2–9.8)
WBC UR QL AUTO: ABNORMAL /HPF

## 2018-03-12 PROCEDURE — 81001 URINALYSIS AUTO W/SCOPE: CPT | Performed by: INTERNAL MEDICINE

## 2018-03-12 PROCEDURE — 80053 COMPREHEN METABOLIC PANEL: CPT | Performed by: INTERNAL MEDICINE

## 2018-03-12 PROCEDURE — 84439 ASSAY OF FREE THYROXINE: CPT | Performed by: INTERNAL MEDICINE

## 2018-03-12 PROCEDURE — 84443 ASSAY THYROID STIM HORMONE: CPT | Performed by: INTERNAL MEDICINE

## 2018-03-12 PROCEDURE — 85025 COMPLETE CBC W/AUTO DIFF WBC: CPT | Performed by: INTERNAL MEDICINE

## 2018-03-12 PROCEDURE — 80061 LIPID PANEL: CPT | Performed by: INTERNAL MEDICINE

## 2018-03-12 PROCEDURE — 36415 COLL VENOUS BLD VENIPUNCTURE: CPT | Performed by: INTERNAL MEDICINE

## 2018-03-13 ENCOUNTER — OFFICE VISIT (OUTPATIENT)
Dept: FAMILY MEDICINE CLINIC | Facility: CLINIC | Age: 73
End: 2018-03-13

## 2018-03-13 VITALS
HEIGHT: 66 IN | HEART RATE: 72 BPM | SYSTOLIC BLOOD PRESSURE: 136 MMHG | TEMPERATURE: 97.6 F | WEIGHT: 161 LBS | BODY MASS INDEX: 25.88 KG/M2 | DIASTOLIC BLOOD PRESSURE: 80 MMHG

## 2018-03-13 DIAGNOSIS — M15.9 PRIMARY OSTEOARTHRITIS INVOLVING MULTIPLE JOINTS: Chronic | ICD-10-CM

## 2018-03-13 DIAGNOSIS — G90.9 PERIPHERAL AUTONOMIC NEUROPATHY: Chronic | ICD-10-CM

## 2018-03-13 DIAGNOSIS — E78.2 MIXED HYPERLIPIDEMIA: Primary | Chronic | ICD-10-CM

## 2018-03-13 PROCEDURE — 99214 OFFICE O/P EST MOD 30 MIN: CPT | Performed by: INTERNAL MEDICINE

## 2018-03-13 RX ORDER — MELOXICAM 15 MG/1
15 TABLET ORAL DAILY
Qty: 90 TABLET | Refills: 3 | Status: SHIPPED | OUTPATIENT
Start: 2018-03-13 | End: 2019-03-15 | Stop reason: SDUPTHER

## 2018-03-13 RX ORDER — ACETAMINOPHEN 500 MG
1000 TABLET ORAL EVERY 8 HOURS PRN
COMMUNITY

## 2018-03-13 RX ORDER — GABAPENTIN 300 MG/1
CAPSULE ORAL
Qty: 120 CAPSULE | Refills: 5 | Status: SHIPPED | OUTPATIENT
Start: 2018-03-13 | End: 2018-09-14 | Stop reason: SDUPTHER

## 2018-03-13 RX ORDER — ATORVASTATIN CALCIUM 20 MG/1
20 TABLET, FILM COATED ORAL NIGHTLY
Qty: 90 TABLET | Refills: 3 | Status: SHIPPED | OUTPATIENT
Start: 2018-03-13 | End: 2019-03-15 | Stop reason: SDUPTHER

## 2018-03-13 NOTE — PROGRESS NOTES
"Chief Complaint   Patient presents with   • Hyperlipidemia     Subjective   Eusebia Braden is a 72 y.o. female who presents to the office for follow-up and review of labs. She has hyperlipidemia and takes Lipitor daily.  She has osteoarthritis which causes low back pain and takes meloxicam daily.  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  The numbness and tingling radiates into the lower extremities bilaterally. This has been stable.    The following portions of the patient's history were reviewed and updated as appropriate: allergies, current medications, past family history, past medical history, past social history, past surgical history and problem list.    Review of Systems   Constitutional: Negative for chills, fatigue and fever.   HENT: Negative for congestion, sneezing, sore throat and trouble swallowing.    Eyes: Negative for visual disturbance.   Respiratory: Negative for cough, chest tightness, shortness of breath and wheezing.    Cardiovascular: Negative for chest pain, palpitations and leg swelling.   Gastrointestinal: Negative for abdominal pain, constipation, diarrhea, nausea and vomiting.   Genitourinary: Negative for dysuria, frequency and urgency.   Musculoskeletal: Positive for arthralgias and back pain. Negative for neck pain.   Skin: Negative for rash.   Neurological: Negative for dizziness, weakness and headaches.   Psychiatric/Behavioral:        Patient denies any feelings of depression and has not felt down, hopeless or lost interest in any activities.       Objective   Vitals:    03/13/18 1048 03/13/18 1103   BP: 136/80    BP Location: Left arm    Patient Position: Sitting    Cuff Size: Large Adult    Pulse: 72    Temp: 97.6 °F (36.4 °C)    TempSrc: Oral    Weight: 73 kg (161 lb)    Height: 167.6 cm (66\")    PainSc: 6  Comment: left leg worse 6  Comment: right knee   PainLoc: Leg Knee     Physical Exam   Constitutional: She is oriented " to person, place, and time. She appears well-developed and well-nourished.   HENT:   Head: Normocephalic and atraumatic.   Nose: Nose normal.   Mouth/Throat: Oropharynx is clear and moist. No oropharyngeal exudate.   Eyes: Conjunctivae and EOM are normal. Pupils are equal, round, and reactive to light. Right eye exhibits no discharge. Left eye exhibits no discharge. No scleral icterus.   Neck: Normal range of motion. Neck supple. No thyromegaly present.   Cardiovascular: Normal rate, regular rhythm, normal heart sounds and intact distal pulses.  Exam reveals no gallop and no friction rub.    No murmur heard.  Pulmonary/Chest: Effort normal and breath sounds normal. No respiratory distress. She has no wheezes. She has no rales.   Abdominal: Soft. Bowel sounds are normal. She exhibits no distension. There is no tenderness. There is no rebound and no guarding.   Musculoskeletal: She exhibits no edema.        Lumbar back: She exhibits pain.   Lymphadenopathy:     She has no cervical adenopathy.   Neurological: She is alert and oriented to person, place, and time. No cranial nerve deficit.   Skin: Skin is warm and dry. No rash noted.   Psychiatric: She has a normal mood and affect. Her behavior is normal. Judgment and thought content normal.   Nursing note and vitals reviewed.      Assessment/Plan   Eusebia was seen today for hyperlipidemia.    Diagnoses and all orders for this visit:    Mixed hyperlipidemia  -     CBC & Differential; Future  -     Comprehensive Metabolic Panel; Future  -     LDL Cholesterol, Direct; Future  -     T4, Free; Future  -     TSH; Future  -     Urinalysis With / Culture If Indicated - Urine, Clean Catch; Future  -     atorvastatin (LIPITOR) 20 MG tablet; Take 1 tablet by mouth Every Night.    Peripheral autonomic neuropathy  -     gabapentin (NEURONTIN) 300 MG capsule; Take 1 capsule each morning and afternoon, and 2 capsules at bedtime    Primary osteoarthritis involving multiple joints  -      meloxicam (MOBIC) 15 MG tablet; Take 1 tablet by mouth Daily.         Labs are reviewed with patient.  Her lipids are at goal and she will continue with Lipitor.  She will continue with meloxicam for treatment of the osteoarthritis.  She will continue with Neurontin for treatment of the neuropathic pain.  She is currently taking 300 mg of Neurontin in the morning and afternoon, and 600 mg at bedtime.    Patient understands the risks associated with this controlled medication, including tolerance and addiction.  Patient also agrees to only obtain this medication from me, and not from a another provider, unless that provider is covering for me in my absence.  Patient also agrees to be compliant in dosing, and not self adjust the dose of medication.  A signed controlled substance agreement is on file, and the patient has received a controlled substance education sheet at this a previous visit.  The patient has also signed a consent for treatment with a controlled substance as per [facility] policy. KESHAWN was obtained.    PHQ-2/PHQ-9 Depression Screening 3/13/2018   Little interest or pleasure in doing things 0   Feeling down, depressed, or hopeless 0   Trouble falling or staying asleep, or sleeping too much 2   Feeling tired or having little energy 3   Poor appetite or overeating 0   Feeling bad about yourself - or that you are a failure or have let yourself or your family down 0   Trouble concentrating on things, such as reading the newspaper or watching television 0   Moving or speaking so slowly that other people could have noticed. Or the opposite - being so fidgety or restless that you have been moving around a lot more than usual 0   Thoughts that you would be better off dead, or of hurting yourself in some way 0   Total Score 5   If you checked off any problems, how difficult have these problems made it for you to do your work, take care of things at home, or get along with other people? Somewhat difficult          Lab on 03/12/2018   Component Date Value Ref Range Status   • Color, UA 03/12/2018 Yellow  Yellow, Straw Final   • Appearance, UA 03/12/2018 Cloudy* Clear Final   • pH, UA 03/12/2018 5.5  5.5 - 8.0 Final   • Specific Gravity, UA 03/12/2018 >=1.030  1.005 - 1.030 Final   • Glucose, UA 03/12/2018 Negative  Negative Final   • Ketones, UA 03/12/2018 Negative  Negative Final   • Bilirubin, UA 03/12/2018 Negative  Negative Final   • Blood, UA 03/12/2018 Negative  Negative Final   • Protein, UA 03/12/2018 Negative  Negative Final   • Leuk Esterase, UA 03/12/2018 Trace* Negative Final   • Nitrite, UA 03/12/2018 Negative  Negative Final   • Urobilinogen, UA 03/12/2018 0.2 E.U./dL  0.2 - 1.0 E.U./dL Final   • Glucose 03/12/2018 96  70 - 100 mg/dL Final   • BUN 03/12/2018 19  8 - 25 mg/dL Final   • Creatinine 03/12/2018 0.60  0.40 - 1.30 mg/dL Final   • Sodium 03/12/2018 141  134 - 146 mmol/L Final   • Potassium 03/12/2018 3.9  3.4 - 5.4 mmol/L Final   • Chloride 03/12/2018 107  100 - 112 mmol/L Final   • CO2 03/12/2018 28.0  20.0 - 32.0 mmol/L Final   • Calcium 03/12/2018 9.2  8.4 - 10.8 mg/dL Final   • Total Protein 03/12/2018 6.7  6.7 - 8.2 g/dL Final   • Albumin 03/12/2018 4.00  3.20 - 5.50 g/dL Final   • ALT (SGPT) 03/12/2018 17  10 - 60 U/L Final   • AST (SGOT) 03/12/2018 24  10 - 60 U/L Final   • Alkaline Phosphatase 03/12/2018 54  15 - 121 U/L Final   • Total Bilirubin 03/12/2018 0.8  0.2 - 1.0 mg/dL Final   • eGFR Non African Amer 03/12/2018 98* 39 - 90 mL/min/1.73 Final   • Globulin 03/12/2018 2.7  2.5 - 4.6 gm/dL Final   • A/G Ratio 03/12/2018 1.5  1.0 - 3.0 g/dL Final   • BUN/Creatinine Ratio 03/12/2018 31.7* 7.0 - 25.0 Final   • Anion Gap 03/12/2018 6.0  5.0 - 15.0 mmol/L Final   • Total Cholesterol 03/12/2018 168  150 - 200 mg/dL Final   • Triglycerides 03/12/2018 97  35 - 160 mg/dL Final   • HDL Cholesterol 03/12/2018 69  35 - 100 mg/dL Final   • LDL Cholesterol  03/12/2018 80  mg/dL Final   • VLDL  Cholesterol 03/12/2018 19.4  mg/dL Final   • LDL/HDL Ratio 03/12/2018 1.15   Final   • Free T4 03/12/2018 1.05  0.78 - 2.19 ng/dL Final   • TSH 03/12/2018 2.740  0.460 - 4.680 mIU/mL Final   • WBC 03/12/2018 5.95  3.20 - 9.80 10*3/mm3 Final   • RBC 03/12/2018 4.77  3.77 - 5.16 10*6/mm3 Final   • Hemoglobin 03/12/2018 12.8  12.0 - 15.5 g/dL Final   • Hematocrit 03/12/2018 40.4  35.0 - 45.0 % Final   • MCV 03/12/2018 84.7  80.0 - 98.0 fL Final   • MCH 03/12/2018 26.8  26.5 - 34.0 pg Final   • MCHC 03/12/2018 31.7  31.4 - 36.0 g/dL Final   • RDW 03/12/2018 13.4  11.5 - 14.5 % Final   • RDW-SD 03/12/2018 40.8  36.4 - 46.3 fl Final   • MPV 03/12/2018 12.4* 8.0 - 12.0 fL Final   • Platelets 03/12/2018 189  150 - 450 10*3/mm3 Final   • Neutrophil % 03/12/2018 58.3  37.0 - 80.0 % Final   • Lymphocyte % 03/12/2018 28.1  10.0 - 50.0 % Final   • Monocyte % 03/12/2018 8.4  0.0 - 12.0 % Final   • Eosinophil % 03/12/2018 4.0  0.0 - 7.0 % Final   • Basophil % 03/12/2018 1.2  0.0 - 2.0 % Final   • Neutrophils, Absolute 03/12/2018 3.47  2.00 - 8.60 10*3/mm3 Final   • Lymphocytes, Absolute 03/12/2018 1.67  0.60 - 4.20 10*3/mm3 Final   • Monocytes, Absolute 03/12/2018 0.50  0.00 - 0.90 10*3/mm3 Final   • Eosinophils, Absolute 03/12/2018 0.24  0.00 - 0.70 10*3/mm3 Final   • Basophils, Absolute 03/12/2018 0.07  0.00 - 0.20 10*3/mm3 Final   • RBC, UA 03/12/2018 0-2* None Seen /HPF Final   • WBC, UA 03/12/2018 3-5* None Seen /HPF Final   • Bacteria, UA 03/12/2018 Trace* None Seen /HPF Final   • Squamous Epithelial Cells, UA 03/12/2018 0-2  None Seen, 0-2 /HPF Final   • Hyaline Casts, UA 03/12/2018 0-2  None Seen /LPF Final   • Mucus, UA 03/12/2018 Trace  None Seen, Trace /HPF Final   • Methodology 03/12/2018 Manual Light Microscopy   Final   ]

## 2018-03-13 NOTE — PROGRESS NOTES
[facility]#  [id number].

## 2018-09-06 ENCOUNTER — LAB (OUTPATIENT)
Dept: LAB | Facility: OTHER | Age: 73
End: 2018-09-06

## 2018-09-06 DIAGNOSIS — E78.2 MIXED HYPERLIPIDEMIA: ICD-10-CM

## 2018-09-06 LAB
ALBUMIN SERPL-MCNC: 4.1 G/DL (ref 3.5–5)
ALBUMIN/GLOB SERPL: 1.3 G/DL (ref 1.1–1.8)
ALP SERPL-CCNC: 61 U/L (ref 38–126)
ALT SERPL W P-5'-P-CCNC: 13 U/L
ANION GAP SERPL CALCULATED.3IONS-SCNC: 6 MMOL/L (ref 5–15)
ARTICHOKE IGE QN: 63 MG/DL (ref 1–129)
AST SERPL-CCNC: 39 U/L (ref 14–36)
BACTERIA UR QL AUTO: ABNORMAL /HPF
BASOPHILS # BLD AUTO: 0.04 10*3/MM3 (ref 0–0.2)
BASOPHILS NFR BLD AUTO: 0.6 % (ref 0–2)
BILIRUB SERPL-MCNC: 1.1 MG/DL (ref 0.2–1.3)
BILIRUB UR QL STRIP: NEGATIVE
BUN BLD-MCNC: 20 MG/DL (ref 7–17)
BUN/CREAT SERPL: 27.4 (ref 7–25)
CALCIUM SPEC-SCNC: 9.6 MG/DL (ref 8.4–10.2)
CHLORIDE SERPL-SCNC: 109 MMOL/L (ref 98–107)
CLARITY UR: ABNORMAL
CO2 SERPL-SCNC: 27 MMOL/L (ref 22–30)
COLOR UR: YELLOW
CREAT BLD-MCNC: 0.73 MG/DL (ref 0.52–1.04)
DEPRECATED RDW RBC AUTO: 40.2 FL (ref 36.4–46.3)
EOSINOPHIL # BLD AUTO: 0.45 10*3/MM3 (ref 0–0.7)
EOSINOPHIL NFR BLD AUTO: 6.6 % (ref 0–7)
ERYTHROCYTE [DISTWIDTH] IN BLOOD BY AUTOMATED COUNT: 13.1 % (ref 11.5–14.5)
GFR SERPL CREATININE-BSD FRML MDRD: 78 ML/MIN/1.73 (ref 39–90)
GLOBULIN UR ELPH-MCNC: 3.1 GM/DL (ref 2.3–3.5)
GLUCOSE BLD-MCNC: 95 MG/DL (ref 74–99)
GLUCOSE UR STRIP-MCNC: NEGATIVE MG/DL
HCT VFR BLD AUTO: 41.1 % (ref 35–45)
HGB BLD-MCNC: 13.2 G/DL (ref 12–15.5)
HGB UR QL STRIP.AUTO: NEGATIVE
HYALINE CASTS UR QL AUTO: ABNORMAL /LPF
KETONES UR QL STRIP: NEGATIVE
LEUKOCYTE ESTERASE UR QL STRIP.AUTO: ABNORMAL
LYMPHOCYTES # BLD AUTO: 1.55 10*3/MM3 (ref 0.6–4.2)
LYMPHOCYTES NFR BLD AUTO: 22.7 % (ref 10–50)
MCH RBC QN AUTO: 28.1 PG (ref 26.5–34)
MCHC RBC AUTO-ENTMCNC: 32.1 G/DL (ref 31.4–36)
MCV RBC AUTO: 87.4 FL (ref 80–98)
MONOCYTES # BLD AUTO: 0.57 10*3/MM3 (ref 0–0.9)
MONOCYTES NFR BLD AUTO: 8.4 % (ref 0–12)
MUCOUS THREADS URNS QL MICRO: ABNORMAL /HPF
NEUTROPHILS # BLD AUTO: 4.21 10*3/MM3 (ref 2–8.6)
NEUTROPHILS NFR BLD AUTO: 61.7 % (ref 37–80)
NITRITE UR QL STRIP: NEGATIVE
PH UR STRIP.AUTO: 5.5 [PH] (ref 5.5–8)
PLATELET # BLD AUTO: 215 10*3/MM3 (ref 150–450)
PMV BLD AUTO: 10.7 FL (ref 8–12)
POTASSIUM BLD-SCNC: 4.4 MMOL/L (ref 3.4–5)
PROT SERPL-MCNC: 7.2 G/DL (ref 6.3–8.2)
PROT UR QL STRIP: NEGATIVE
RBC # BLD AUTO: 4.7 10*6/MM3 (ref 3.77–5.16)
RBC # UR: ABNORMAL /HPF
REF LAB TEST METHOD: ABNORMAL
SODIUM BLD-SCNC: 142 MMOL/L (ref 137–145)
SP GR UR STRIP: 1.02 (ref 1–1.03)
SQUAMOUS #/AREA URNS HPF: ABNORMAL /HPF
T4 FREE SERPL-MCNC: 1.06 NG/DL (ref 0.78–2.19)
TSH SERPL DL<=0.05 MIU/L-ACNC: 1.11 MIU/ML (ref 0.46–4.68)
UROBILINOGEN UR QL STRIP: ABNORMAL
WBC NRBC COR # BLD: 6.82 10*3/MM3 (ref 3.2–9.8)
WBC UR QL AUTO: ABNORMAL /HPF

## 2018-09-06 PROCEDURE — 83721 ASSAY OF BLOOD LIPOPROTEIN: CPT | Performed by: INTERNAL MEDICINE

## 2018-09-06 PROCEDURE — 84443 ASSAY THYROID STIM HORMONE: CPT | Performed by: INTERNAL MEDICINE

## 2018-09-06 PROCEDURE — 85025 COMPLETE CBC W/AUTO DIFF WBC: CPT | Performed by: INTERNAL MEDICINE

## 2018-09-06 PROCEDURE — 81001 URINALYSIS AUTO W/SCOPE: CPT | Performed by: INTERNAL MEDICINE

## 2018-09-06 PROCEDURE — 36415 COLL VENOUS BLD VENIPUNCTURE: CPT | Performed by: INTERNAL MEDICINE

## 2018-09-06 PROCEDURE — 80053 COMPREHEN METABOLIC PANEL: CPT | Performed by: INTERNAL MEDICINE

## 2018-09-06 PROCEDURE — 84439 ASSAY OF FREE THYROXINE: CPT | Performed by: INTERNAL MEDICINE

## 2018-09-14 ENCOUNTER — OFFICE VISIT (OUTPATIENT)
Dept: FAMILY MEDICINE CLINIC | Facility: CLINIC | Age: 73
End: 2018-09-14

## 2018-09-14 VITALS
HEART RATE: 62 BPM | DIASTOLIC BLOOD PRESSURE: 80 MMHG | HEIGHT: 66 IN | WEIGHT: 158 LBS | SYSTOLIC BLOOD PRESSURE: 128 MMHG | OXYGEN SATURATION: 98 % | TEMPERATURE: 97.8 F | BODY MASS INDEX: 25.39 KG/M2

## 2018-09-14 DIAGNOSIS — G90.9 PERIPHERAL AUTONOMIC NEUROPATHY: Chronic | ICD-10-CM

## 2018-09-14 DIAGNOSIS — E78.2 MIXED HYPERLIPIDEMIA: Primary | Chronic | ICD-10-CM

## 2018-09-14 DIAGNOSIS — M15.9 PRIMARY OSTEOARTHRITIS INVOLVING MULTIPLE JOINTS: Chronic | ICD-10-CM

## 2018-09-14 DIAGNOSIS — Z23 PNEUMOCOCCAL VACCINATION GIVEN: ICD-10-CM

## 2018-09-14 PROCEDURE — 99214 OFFICE O/P EST MOD 30 MIN: CPT | Performed by: INTERNAL MEDICINE

## 2018-09-14 PROCEDURE — G0008 ADMIN INFLUENZA VIRUS VAC: HCPCS | Performed by: INTERNAL MEDICINE

## 2018-09-14 PROCEDURE — 90732 PPSV23 VACC 2 YRS+ SUBQ/IM: CPT | Performed by: INTERNAL MEDICINE

## 2018-09-14 RX ORDER — GABAPENTIN 300 MG/1
CAPSULE ORAL
Qty: 120 CAPSULE | Refills: 5 | Status: SHIPPED | OUTPATIENT
Start: 2018-09-14 | End: 2019-03-15 | Stop reason: SDUPTHER

## 2018-09-14 NOTE — PROGRESS NOTES
[facility]# [id number].

## 2018-12-14 ENCOUNTER — PRIOR AUTHORIZATION (OUTPATIENT)
Dept: FAMILY MEDICINE CLINIC | Facility: CLINIC | Age: 73
End: 2018-12-14

## 2018-12-14 ENCOUNTER — OFFICE VISIT (OUTPATIENT)
Dept: FAMILY MEDICINE CLINIC | Facility: CLINIC | Age: 73
End: 2018-12-14

## 2018-12-14 VITALS
BODY MASS INDEX: 26.2 KG/M2 | HEART RATE: 74 BPM | HEIGHT: 66 IN | DIASTOLIC BLOOD PRESSURE: 84 MMHG | WEIGHT: 163 LBS | TEMPERATURE: 97.4 F | SYSTOLIC BLOOD PRESSURE: 128 MMHG

## 2018-12-14 DIAGNOSIS — F41.1 GENERALIZED ANXIETY DISORDER: ICD-10-CM

## 2018-12-14 DIAGNOSIS — F43.21 GRIEF REACTION: Primary | ICD-10-CM

## 2018-12-14 DIAGNOSIS — Z63.4 BEREAVEMENT DUE TO LIFE EVENT: ICD-10-CM

## 2018-12-14 PROCEDURE — 99214 OFFICE O/P EST MOD 30 MIN: CPT | Performed by: INTERNAL MEDICINE

## 2018-12-14 RX ORDER — CITALOPRAM 10 MG/1
10 TABLET ORAL DAILY
Qty: 30 TABLET | Refills: 5 | Status: SHIPPED | OUTPATIENT
Start: 2018-12-14 | End: 2019-03-15 | Stop reason: SDUPTHER

## 2018-12-14 RX ORDER — LORAZEPAM 0.5 MG/1
0.5 TABLET ORAL EVERY 8 HOURS PRN
Qty: 60 TABLET | Refills: 1 | Status: SHIPPED | OUTPATIENT
Start: 2018-12-14 | End: 2019-03-15 | Stop reason: SDUPTHER

## 2018-12-14 SDOH — SOCIAL STABILITY - SOCIAL INSECURITY: DISSAPEARANCE AND DEATH OF FAMILY MEMBER: Z63.4

## 2018-12-14 NOTE — TELEPHONE ENCOUNTER
Received fax notification from [facility] that a PA is required for the Lorazepam 0.5mg tab.   Submitted via [place] [employer]#[id number]. Received approved, faxed to [facility].

## 2018-12-14 NOTE — PROGRESS NOTES
[facility]#  [id number]

## 2018-12-14 NOTE — PROGRESS NOTES
"Chief Complaint   Patient presents with   • Osteoarthritis     pt also c/o anxiety and insomnia     Subjective   Eusebia Braden is a 73 y.o. female who presents to the office complaining of anxiety.  Her son recently committed suicide.  She has been having frequent crying spells, and difficulty sleeping as she goes through the bereavement process.  She denies any suicidal or homicidal thoughts.  She has a supportive family.  Her 2 daughters have been helping her to cope.  They are planning to take a family trip in the near future to get away from everything.    The following portions of the patient's history were reviewed and updated as appropriate: allergies, current medications, past family history, past medical history, past social history, past surgical history and problem list.    Review of Systems   Constitutional: Negative for chills, fatigue and fever.   HENT: Negative for congestion, sneezing, sore throat and trouble swallowing.    Eyes: Negative for visual disturbance.   Respiratory: Negative for cough, chest tightness, shortness of breath and wheezing.    Cardiovascular: Negative for chest pain, palpitations and leg swelling.   Gastrointestinal: Negative for abdominal pain, constipation, diarrhea, nausea and vomiting.   Genitourinary: Negative for dysuria, frequency and urgency.   Musculoskeletal: Positive for arthralgias and back pain. Negative for neck pain.   Skin: Negative for rash.   Neurological: Negative for dizziness, weakness and headaches.   Psychiatric/Behavioral: Positive for sleep disturbance. The patient is nervous/anxious.         Patient denies any feelings of depression and has not felt down, hopeless or lost interest in any activities.       Objective   Vitals:    12/14/18 0901   BP: 128/84   BP Location: Left arm   Patient Position: Sitting   Cuff Size: Adult   Pulse: 74   Temp: 97.4 °F (36.3 °C)   TempSrc: Oral   Weight: 73.9 kg (163 lb)   Height: 167.6 cm (66\")   PainSc:   5 "   PainLoc: Knee  Comment: bilat knee     Physical Exam   Constitutional: She is oriented to person, place, and time. She appears well-developed and well-nourished.   HENT:   Head: Normocephalic and atraumatic.   Nose: Nose normal.   Mouth/Throat: Oropharynx is clear and moist. No oropharyngeal exudate.   Eyes: Conjunctivae and EOM are normal. Pupils are equal, round, and reactive to light. Right eye exhibits no discharge. Left eye exhibits no discharge. No scleral icterus.   Neck: Normal range of motion. Neck supple. No thyromegaly present.   Cardiovascular: Normal rate, regular rhythm, normal heart sounds and intact distal pulses. Exam reveals no gallop and no friction rub.   No murmur heard.  Pulmonary/Chest: Effort normal and breath sounds normal. No respiratory distress. She has no wheezes. She has no rales.   Abdominal: Soft. Bowel sounds are normal. She exhibits no distension. There is no tenderness. There is no rebound and no guarding.   Musculoskeletal: She exhibits no edema.        Lumbar back: She exhibits pain.   Lymphadenopathy:     She has no cervical adenopathy.   Neurological: She is alert and oriented to person, place, and time. No cranial nerve deficit.   Skin: Skin is warm and dry. No rash noted.   Psychiatric: She has a normal mood and affect. Her behavior is normal. Judgment and thought content normal.   Nursing note and vitals reviewed.      Assessment/Plan   Eusebia was seen today for osteoarthritis.    Diagnoses and all orders for this visit:    Grief reaction    Bereavement due to life event    Generalized anxiety disorder  -     citalopram (CeleXA) 10 MG tablet; Take 1 tablet by mouth Daily.  -     LORazepam (ATIVAN) 0.5 MG tablet; Take 1 tablet by mouth Every 8 (Eight) Hours As Needed for Anxiety.    I will start her on a low-dose of citalopram to help with anxiety and depression.  She is given lorazepam to use as needed on a short-term basis to help with the grief and anxiety.  I spent 20  minutes face-to-face with the patient providing counseling and medical management.    Patient understands the risks associated with this controlled medication, including tolerance and addiction.  Patient also agrees to only obtain this medication from me, and not from a another provider, unless that provider is covering for me in my absence.  Patient also agrees to be compliant in dosing, and not self adjust the dose of medication.  A signed controlled substance agreement is on file, and the patient has received a controlled substance education sheet at this or a previous visit.  The patient has also signed a consent for treatment with a controlled substance as per [facility] policy. KESHAWN is obtained.         PHQ-2/PHQ-9 Depression Screening 12/14/2018   Little interest or pleasure in doing things 3   Feeling down, depressed, or hopeless 3   Trouble falling or staying asleep, or sleeping too much 3   Feeling tired or having little energy 3   Poor appetite or overeating 3   Feeling bad about yourself - or that you are a failure or have let yourself or your family down 1   Trouble concentrating on things, such as reading the newspaper or watching television 1   Moving or speaking so slowly that other people could have noticed. Or the opposite - being so fidgety or restless that you have been moving around a lot more than usual 0   Thoughts that you would be better off dead, or of hurting yourself in some way 0   Total Score 17   If you checked off any problems, how difficult have these problems made it for you to do your work, take care of things at home, or get along with other people? Very difficult

## 2019-03-14 ENCOUNTER — LAB (OUTPATIENT)
Dept: LAB | Facility: OTHER | Age: 74
End: 2019-03-14

## 2019-03-14 DIAGNOSIS — E78.2 MIXED HYPERLIPIDEMIA: ICD-10-CM

## 2019-03-14 LAB
ALBUMIN SERPL-MCNC: 4.3 G/DL (ref 3.5–5)
ALBUMIN/GLOB SERPL: 1.6 G/DL (ref 1.1–1.8)
ALP SERPL-CCNC: 72 U/L (ref 38–126)
ALT SERPL W P-5'-P-CCNC: 15 U/L
ANION GAP SERPL CALCULATED.3IONS-SCNC: 9 MMOL/L (ref 5–15)
AST SERPL-CCNC: 23 U/L (ref 14–36)
BASOPHILS # BLD AUTO: 0.04 10*3/MM3 (ref 0–0.2)
BASOPHILS NFR BLD AUTO: 0.6 % (ref 0–2)
BILIRUB SERPL-MCNC: 1 MG/DL (ref 0.2–1.3)
BUN BLD-MCNC: 16 MG/DL (ref 7–17)
BUN/CREAT SERPL: 21.6 (ref 7–25)
CALCIUM SPEC-SCNC: 9.3 MG/DL (ref 8.4–10.2)
CHLORIDE SERPL-SCNC: 105 MMOL/L (ref 98–107)
CHOLEST SERPL-MCNC: 150 MG/DL (ref 150–200)
CO2 SERPL-SCNC: 27 MMOL/L (ref 22–30)
CREAT BLD-MCNC: 0.74 MG/DL (ref 0.52–1.04)
DEPRECATED RDW RBC AUTO: 39.9 FL (ref 36.4–46.3)
EOSINOPHIL # BLD AUTO: 0.23 10*3/MM3 (ref 0–0.7)
EOSINOPHIL NFR BLD AUTO: 3.6 % (ref 0–7)
ERYTHROCYTE [DISTWIDTH] IN BLOOD BY AUTOMATED COUNT: 13.1 % (ref 11.5–14.5)
GFR SERPL CREATININE-BSD FRML MDRD: 77 ML/MIN/1.73 (ref 39–90)
GLOBULIN UR ELPH-MCNC: 2.7 GM/DL (ref 2.3–3.5)
GLUCOSE BLD-MCNC: 97 MG/DL (ref 74–99)
HCT VFR BLD AUTO: 39.6 % (ref 35–45)
HDLC SERPL-MCNC: 59 MG/DL (ref 40–59)
HGB BLD-MCNC: 12.9 G/DL (ref 12–15.5)
LDLC SERPL CALC-MCNC: 74 MG/DL
LDLC/HDLC SERPL: 1.25 {RATIO} (ref 0–3.22)
LYMPHOCYTES # BLD AUTO: 1.47 10*3/MM3 (ref 0.6–4.2)
LYMPHOCYTES NFR BLD AUTO: 23.1 % (ref 10–50)
MCH RBC QN AUTO: 27.8 PG (ref 26.5–34)
MCHC RBC AUTO-ENTMCNC: 32.6 G/DL (ref 31.4–36)
MCV RBC AUTO: 85.3 FL (ref 80–98)
MONOCYTES # BLD AUTO: 0.54 10*3/MM3 (ref 0–0.9)
MONOCYTES NFR BLD AUTO: 8.5 % (ref 0–12)
NEUTROPHILS # BLD AUTO: 4.08 10*3/MM3 (ref 2–8.6)
NEUTROPHILS NFR BLD AUTO: 64.2 % (ref 37–80)
PLATELET # BLD AUTO: 194 10*3/MM3 (ref 150–450)
PMV BLD AUTO: 10.6 FL (ref 8–12)
POTASSIUM BLD-SCNC: 4.2 MMOL/L (ref 3.4–5)
PROT SERPL-MCNC: 7 G/DL (ref 6.3–8.2)
RBC # BLD AUTO: 4.64 10*6/MM3 (ref 3.77–5.16)
SODIUM BLD-SCNC: 141 MMOL/L (ref 137–145)
T4 FREE SERPL-MCNC: 0.8 NG/DL (ref 0.78–2.19)
TRIGL SERPL-MCNC: 86 MG/DL
TSH SERPL DL<=0.05 MIU/L-ACNC: 2.52 MIU/ML (ref 0.46–4.68)
VLDLC SERPL-MCNC: 17.2 MG/DL
WBC NRBC COR # BLD: 6.36 10*3/MM3 (ref 3.2–9.8)

## 2019-03-14 PROCEDURE — 80061 LIPID PANEL: CPT | Performed by: INTERNAL MEDICINE

## 2019-03-14 PROCEDURE — 85025 COMPLETE CBC W/AUTO DIFF WBC: CPT | Performed by: INTERNAL MEDICINE

## 2019-03-14 PROCEDURE — 84439 ASSAY OF FREE THYROXINE: CPT | Performed by: INTERNAL MEDICINE

## 2019-03-14 PROCEDURE — 36415 COLL VENOUS BLD VENIPUNCTURE: CPT | Performed by: INTERNAL MEDICINE

## 2019-03-14 PROCEDURE — 84443 ASSAY THYROID STIM HORMONE: CPT | Performed by: INTERNAL MEDICINE

## 2019-03-14 PROCEDURE — 80053 COMPREHEN METABOLIC PANEL: CPT | Performed by: INTERNAL MEDICINE

## 2019-03-15 ENCOUNTER — OFFICE VISIT (OUTPATIENT)
Dept: FAMILY MEDICINE CLINIC | Facility: CLINIC | Age: 74
End: 2019-03-15

## 2019-03-15 VITALS
HEART RATE: 78 BPM | SYSTOLIC BLOOD PRESSURE: 122 MMHG | WEIGHT: 164 LBS | HEIGHT: 66 IN | TEMPERATURE: 97.7 F | DIASTOLIC BLOOD PRESSURE: 82 MMHG | BODY MASS INDEX: 26.36 KG/M2

## 2019-03-15 DIAGNOSIS — E78.2 MIXED HYPERLIPIDEMIA: Chronic | ICD-10-CM

## 2019-03-15 DIAGNOSIS — F41.1 GENERALIZED ANXIETY DISORDER: ICD-10-CM

## 2019-03-15 DIAGNOSIS — M15.9 PRIMARY OSTEOARTHRITIS INVOLVING MULTIPLE JOINTS: Chronic | ICD-10-CM

## 2019-03-15 DIAGNOSIS — Z00.00 INITIAL MEDICARE ANNUAL WELLNESS VISIT: Primary | ICD-10-CM

## 2019-03-15 DIAGNOSIS — G90.9 PERIPHERAL AUTONOMIC NEUROPATHY: Chronic | ICD-10-CM

## 2019-03-15 PROCEDURE — G0438 PPPS, INITIAL VISIT: HCPCS | Performed by: INTERNAL MEDICINE

## 2019-03-15 PROCEDURE — 99214 OFFICE O/P EST MOD 30 MIN: CPT | Performed by: INTERNAL MEDICINE

## 2019-03-15 RX ORDER — GABAPENTIN 300 MG/1
CAPSULE ORAL
Qty: 360 CAPSULE | Refills: 1 | Status: SHIPPED | OUTPATIENT
Start: 2019-03-15 | End: 2019-11-11 | Stop reason: SDUPTHER

## 2019-03-15 RX ORDER — ATORVASTATIN CALCIUM 20 MG/1
20 TABLET, FILM COATED ORAL NIGHTLY
Qty: 90 TABLET | Refills: 3 | Status: SHIPPED | OUTPATIENT
Start: 2019-03-15 | End: 2020-03-16 | Stop reason: SDUPTHER

## 2019-03-15 RX ORDER — MELOXICAM 15 MG/1
15 TABLET ORAL DAILY
Qty: 90 TABLET | Refills: 3 | Status: SHIPPED | OUTPATIENT
Start: 2019-03-15 | End: 2020-03-16 | Stop reason: SDUPTHER

## 2019-03-15 RX ORDER — CITALOPRAM 20 MG/1
20 TABLET ORAL DAILY
Qty: 90 TABLET | Refills: 3 | Status: SHIPPED | OUTPATIENT
Start: 2019-03-15 | End: 2020-03-16 | Stop reason: SDUPTHER

## 2019-03-15 RX ORDER — LORAZEPAM 0.5 MG/1
0.5 TABLET ORAL EVERY 8 HOURS PRN
Qty: 60 TABLET | Refills: 1 | Status: SHIPPED | OUTPATIENT
Start: 2019-03-15 | End: 2019-09-16 | Stop reason: SDUPTHER

## 2019-03-15 NOTE — PROGRESS NOTES
[facility]#  [id number].

## 2019-03-15 NOTE — PROGRESS NOTES
Chief Complaint   Patient presents with   • Medicare Wellness-Initial Visit   • Hyperlipidemia     6 mo f/u with labs     Subjective   Eusebia Braden is a 73 y.o. female who presents to the office for follow-up and review of labs. She has hyperlipidemia and takes Lipitor daily.  She has osteoarthritis which causes low back pain. She takes meloxicam daily which keeps her arthritic pain at a tolerable level.  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  The numbness and tingling radiates into the lower extremities bilaterally. This has been baseline and controlled.  At the last visit I started her on Celexa daily and lorazepam as needed secondary to grief reaction.  Her son had just committed suicide.  She continues to have episodes of increased depression and anxiety, but overall she feels like the medication has given her some relief in symptoms.  She still has some difficulty sleeping at night.  She is still having some flashbacks to the day he was found.    The following portions of the patient's history were reviewed and updated as appropriate: allergies, current medications, past family history, past medical history, past social history, past surgical history and problem list.    Review of Systems   Constitutional: Negative for chills, fatigue and fever.   HENT: Negative for congestion, sneezing, sore throat and trouble swallowing.    Eyes: Negative for visual disturbance.   Respiratory: Negative for cough, chest tightness, shortness of breath and wheezing.    Cardiovascular: Negative for chest pain, palpitations and leg swelling.   Gastrointestinal: Negative for abdominal pain, constipation, diarrhea, nausea and vomiting.   Genitourinary: Negative for dysuria, frequency and urgency.   Musculoskeletal: Positive for arthralgias and back pain. Negative for neck pain.   Skin: Negative for rash.   Neurological: Negative for dizziness, weakness and headaches.  "  Psychiatric/Behavioral: Positive for sleep disturbance. The patient is nervous/anxious.        Objective   Vitals:    03/15/19 1103   BP: 122/82   BP Location: Left arm   Patient Position: Sitting   Cuff Size: Adult   Pulse: 78   Temp: 97.7 °F (36.5 °C)   TempSrc: Oral   Weight: 74.4 kg (164 lb)   Height: 167.6 cm (66\")   PainSc: 0-No pain     Physical Exam   Constitutional: She is oriented to person, place, and time. She appears well-developed and well-nourished.   HENT:   Head: Normocephalic and atraumatic.   Nose: Nose normal.   Mouth/Throat: Oropharynx is clear and moist. No oropharyngeal exudate.   Eyes: Conjunctivae and EOM are normal. Pupils are equal, round, and reactive to light. Right eye exhibits no discharge. Left eye exhibits no discharge. No scleral icterus.   Neck: Normal range of motion. Neck supple. No thyromegaly present.   Cardiovascular: Normal rate, regular rhythm, normal heart sounds and intact distal pulses. Exam reveals no gallop and no friction rub.   No murmur heard.  Pulmonary/Chest: Effort normal and breath sounds normal. No respiratory distress. She has no wheezes. She has no rales.   Abdominal: Soft. Bowel sounds are normal. She exhibits no distension. There is no tenderness. There is no rebound and no guarding.   Musculoskeletal: She exhibits no edema.        Lumbar back: She exhibits pain.   Lymphadenopathy:     She has no cervical adenopathy.   Neurological: She is alert and oriented to person, place, and time. No cranial nerve deficit.   Skin: Skin is warm and dry. No rash noted.   Psychiatric: She has a normal mood and affect. Her behavior is normal. Judgment and thought content normal.   Nursing note and vitals reviewed.      Assessment/Plan   Eusebia was seen today for medicare wellness-initial visit and hyperlipidemia.    Diagnoses and all orders for this visit:    Initial Medicare annual wellness visit    Mixed hyperlipidemia  -     CBC & Differential; Future  -     " Comprehensive Metabolic Panel; Future  -     T4, Free; Future  -     TSH; Future  -     Urinalysis With Culture If Indicated - Urine, Clean Catch; Future  -     LDL Cholesterol, Direct; Future  -     atorvastatin (LIPITOR) 20 MG tablet; Take 1 tablet by mouth Every Night.    Peripheral autonomic neuropathy  -     gabapentin (NEURONTIN) 300 MG capsule; Take 1 capsule each morning and afternoon, and 2 capsules at bedtime    Primary osteoarthritis involving multiple joints  -     meloxicam (MOBIC) 15 MG tablet; Take 1 tablet by mouth Daily.    Generalized anxiety disorder  -     citalopram (CeleXA) 20 MG tablet; Take 1 tablet by mouth Daily.  -     LORazepam (ATIVAN) 0.5 MG tablet; Take 1 tablet by mouth Every 8 (Eight) Hours As Needed for Anxiety.         Labs are reviewed with patient.  Overall her labs look good.  Her lipids are at goal, and she will continue with Lipitor.  She will continue with meloxicam for treatment of the osteoarthritis.  She will continue with Neurontin for treatment of the neuropathic pain.  She will continue with citalopram, and I will increase this to 20 mg daily.  She will also continue with lorazepam as needed for anxiety symptoms related to the grief reaction.    Patient understands the risks associated with this controlled medication, including tolerance and addiction.  Patient also agrees to only obtain this medication from me, and not from a another provider, unless that provider is covering for me in my absence.  Patient also agrees to be compliant in dosing, and not self adjust the dose of medication.  A signed controlled substance agreement is on file, and the patient has received a controlled substance education sheet at this a previous visit.  The patient has also signed a consent for treatment with a controlled substance as per [facility] policy. KESHAWN was obtained.      PHQ-2/PHQ-9 Depression Screening 3/15/2019   Little interest or pleasure in doing things 0   Feeling  down, depressed, or hopeless 0   Trouble falling or staying asleep, or sleeping too much -   Feeling tired or having little energy -   Poor appetite or overeating -   Feeling bad about yourself - or that you are a failure or have let yourself or your family down -   Trouble concentrating on things, such as reading the newspaper or watching television -   Moving or speaking so slowly that other people could have noticed. Or the opposite - being so fidgety or restless that you have been moving around a lot more than usual -   Thoughts that you would be better off dead, or of hurting yourself in some way -   Total Score 0   If you checked off any problems, how difficult have these problems made it for you to do your work, take care of things at home, or get along with other people? -         Lab on 03/14/2019   Component Date Value Ref Range Status   • Glucose 03/14/2019 97  74 - 99 mg/dL Final   • BUN 03/14/2019 16  7 - 17 mg/dL Final   • Creatinine 03/14/2019 0.74  0.52 - 1.04 mg/dL Final   • Sodium 03/14/2019 141  137 - 145 mmol/L Final   • Potassium 03/14/2019 4.2  3.4 - 5.0 mmol/L Final   • Chloride 03/14/2019 105  98 - 107 mmol/L Final   • CO2 03/14/2019 27.0  22.0 - 30.0 mmol/L Final   • Calcium 03/14/2019 9.3  8.4 - 10.2 mg/dL Final   • Total Protein 03/14/2019 7.0  6.3 - 8.2 g/dL Final   • Albumin 03/14/2019 4.30  3.50 - 5.00 g/dL Final   • ALT (SGPT) 03/14/2019 15  <=35 U/L Final   • AST (SGOT) 03/14/2019 23  14 - 36 U/L Final   • Alkaline Phosphatase 03/14/2019 72  38 - 126 U/L Final   • Total Bilirubin 03/14/2019 1.0  0.2 - 1.3 mg/dL Final   • eGFR Non African Amer 03/14/2019 77  39 - 90 mL/min/1.73 Final   • Globulin 03/14/2019 2.7  2.3 - 3.5 gm/dL Final   • A/G Ratio 03/14/2019 1.6  1.1 - 1.8 g/dL Final   • BUN/Creatinine Ratio 03/14/2019 21.6  7.0 - 25.0 Final   • Anion Gap 03/14/2019 9.0  5.0 - 15.0 mmol/L Final   • Total Cholesterol 03/14/2019 150  150 - 200 mg/dL Final   • Triglycerides 03/14/2019 86   <=150 mg/dL Final   • HDL Cholesterol 03/14/2019 59  40 - 59 mg/dL Final   • LDL Cholesterol  03/14/2019 74  <=100 mg/dL Final   • VLDL Cholesterol 03/14/2019 17.2  mg/dL Final   • LDL/HDL Ratio 03/14/2019 1.25  0.00 - 3.22 Final   • Free T4 03/14/2019 0.80  0.78 - 2.19 ng/dL Final   • TSH 03/14/2019 2.520  0.460 - 4.680 mIU/mL Final   • WBC 03/14/2019 6.36  3.20 - 9.80 10*3/mm3 Final   • RBC 03/14/2019 4.64  3.77 - 5.16 10*6/mm3 Final   • Hemoglobin 03/14/2019 12.9  12.0 - 15.5 g/dL Final   • Hematocrit 03/14/2019 39.6  35.0 - 45.0 % Final   • MCV 03/14/2019 85.3  80.0 - 98.0 fL Final   • MCH 03/14/2019 27.8  26.5 - 34.0 pg Final   • MCHC 03/14/2019 32.6  31.4 - 36.0 g/dL Final   • RDW 03/14/2019 13.1  11.5 - 14.5 % Final   • RDW-SD 03/14/2019 39.9  36.4 - 46.3 fl Final   • MPV 03/14/2019 10.6  8.0 - 12.0 fL Final   • Platelets 03/14/2019 194  150 - 450 10*3/mm3 Final   • Neutrophil % 03/14/2019 64.2  37.0 - 80.0 % Final   • Lymphocyte % 03/14/2019 23.1  10.0 - 50.0 % Final   • Monocyte % 03/14/2019 8.5  0.0 - 12.0 % Final   • Eosinophil % 03/14/2019 3.6  0.0 - 7.0 % Final   • Basophil % 03/14/2019 0.6  0.0 - 2.0 % Final   • Neutrophils, Absolute 03/14/2019 4.08  2.00 - 8.60 10*3/mm3 Final   • Lymphocytes, Absolute 03/14/2019 1.47  0.60 - 4.20 10*3/mm3 Final   • Monocytes, Absolute 03/14/2019 0.54  0.00 - 0.90 10*3/mm3 Final   • Eosinophils, Absolute 03/14/2019 0.23  0.00 - 0.70 10*3/mm3 Final   • Basophils, Absolute 03/14/2019 0.04  0.00 - 0.20 10*3/mm3 Final   ].  .

## 2019-03-15 NOTE — PROGRESS NOTES
QUICK REFERENCE INFORMATION:  The ABCs of the Annual Wellness Visit    Initial Medicare Wellness Visit    HEALTH RISK ASSESSMENT    1945    Recent Hospitalizations:  No hospitalization(s) within the last year..        Current Medical Providers:  Patient Care Team:  Trung Collins MD as PCP - General (Family Medicine)        Smoking Status:  Social History     Tobacco Use   Smoking Status Former Smoker   • Years: 20.00   • Last attempt to quit: 2011   • Years since quittin.5   Smokeless Tobacco Never Used       Alcohol Consumption:  Social History     Substance and Sexual Activity   Alcohol Use No       Depression Screen:   PHQ-2/PHQ-9 Depression Screening 2018   Little interest or pleasure in doing things 3   Feeling down, depressed, or hopeless 3   Trouble falling or staying asleep, or sleeping too much 3   Feeling tired or having little energy 3   Poor appetite or overeating 3   Feeling bad about yourself - or that you are a failure or have let yourself or your family down 1   Trouble concentrating on things, such as reading the newspaper or watching television 1   Moving or speaking so slowly that other people could have noticed. Or the opposite - being so fidgety or restless that you have been moving around a lot more than usual 0   Thoughts that you would be better off dead, or of hurting yourself in some way 0   Total Score 17   If you checked off any problems, how difficult have these problems made it for you to do your work, take care of things at home, or get along with other people? Very difficult       Health Habits and Functional and Cognitive Screening:  No flowsheet data found.        Does the patient have evidence of cognitive impairment? No    Asiprin use counseling: Does not take ASA      Recent Lab Results:    Visual Acuity:  No exam data present    Age-appropriate Screening Schedule:  Refer to the list below for future screening recommendations based on patient's age, sex  and/or medical conditions. Orders for these recommended tests are listed in the plan section. The patient has been provided with a written plan.    Health Maintenance   Topic Date Due   • ZOSTER VACCINE (2 of 2) 07/26/2017   • MAMMOGRAM  09/14/2018   • DXA SCAN  09/13/2019   • LIPID PANEL  03/14/2020   • COLONOSCOPY  08/30/2026   • TDAP/TD VACCINES (2 - Td) 05/31/2027   • PNEUMOCOCCAL VACCINES (65+ LOW/MEDIUM RISK)  Completed   • INFLUENZA VACCINE  Addressed        Subjective   History of Present Illness    Eusebia Braden is a 73 y.o. female who presents for an Annual Wellness Visit.    The following portions of the patient's history were reviewed and updated as appropriate: allergies, current medications, past family history, past medical history, past social history, past surgical history and problem list.    Outpatient Medications Prior to Visit   Medication Sig Dispense Refill   • acetaminophen (TYLENOL) 500 MG tablet Take 1,000 mg by mouth Every 8 (Eight) Hours As Needed for Mild Pain .     • atorvastatin (LIPITOR) 20 MG tablet Take 1 tablet by mouth Every Night. 90 tablet 3   • citalopram (CeleXA) 10 MG tablet Take 1 tablet by mouth Daily. 30 tablet 5   • gabapentin (NEURONTIN) 300 MG capsule Take 1 capsule each morning and afternoon, and 2 capsules at bedtime 120 capsule 5   • loratadine (CLARITIN) 10 MG tablet Take 10 mg by mouth daily.     • LORazepam (ATIVAN) 0.5 MG tablet Take 1 tablet by mouth Every 8 (Eight) Hours As Needed for Anxiety. 60 tablet 1   • meloxicam (MOBIC) 15 MG tablet Take 1 tablet by mouth Daily. 90 tablet 3     No facility-administered medications prior to visit.        Patient Active Problem List   Diagnosis   • Peripheral autonomic neuropathy   • Primary osteoarthritis involving multiple joints   • Mixed hyperlipidemia   • Chronic nonseasonal allergic rhinitis due to pollen       Advance Care Planning:  has NO advance directive - not interested in additional  information    Identification of Risk Factors:  Risk factors include: depression.    Review of Systems    Compared to one year ago, the patient feels her physical health is the same.  Compared to one year ago, the patient feels her mental health is the same.    Objective     Physical Exam    There were no vitals filed for this visit.    Patient's There is no height or weight on file to calculate BMI. BMI is within normal parameters. No follow-up required..      Assessment/Plan   Patient Self-Management and Personalized Health Advice  The patient has been provided with information about: diet and exercise and preventive services including:   · Diabetes screening, see lab orders, Exercise counseling provided, Fall Risk assessment done, Nutrition counseling provided.    Visit Diagnoses:    ICD-10-CM ICD-9-CM   1. Initial Medicare annual wellness visit Z00.00 V70.0   2. Mixed hyperlipidemia E78.2 272.2   3. Peripheral autonomic neuropathy G90.9 337.9   4. Primary osteoarthritis involving multiple joints M15.0 715.09   5. Chronic nonseasonal allergic rhinitis due to pollen J30.89 477.0       Orders Placed This Encounter   Procedures   • Comprehensive Metabolic Panel     Standing Status:   Future     Standing Expiration Date:   3/15/2020   • T4, Free     Standing Status:   Future     Standing Expiration Date:   3/15/2020   • TSH     Standing Status:   Future     Standing Expiration Date:   3/15/2020   • Urinalysis With Culture If Indicated - Urine, Clean Catch     Standing Status:   Future     Standing Expiration Date:   3/15/2020   • LDL Cholesterol, Direct     Standing Status:   Future     Standing Expiration Date:   3/15/2020   • CBC & Differential     Standing Status:   Future     Standing Expiration Date:   3/15/2020     Order Specific Question:   Manual Differential     Answer:   No       Outpatient Encounter Medications as of 3/15/2019   Medication Sig Dispense Refill   • acetaminophen (TYLENOL) 500 MG tablet Take  1,000 mg by mouth Every 8 (Eight) Hours As Needed for Mild Pain .     • atorvastatin (LIPITOR) 20 MG tablet Take 1 tablet by mouth Every Night. 90 tablet 3   • citalopram (CeleXA) 10 MG tablet Take 1 tablet by mouth Daily. 30 tablet 5   • gabapentin (NEURONTIN) 300 MG capsule Take 1 capsule each morning and afternoon, and 2 capsules at bedtime 120 capsule 5   • loratadine (CLARITIN) 10 MG tablet Take 10 mg by mouth daily.     • LORazepam (ATIVAN) 0.5 MG tablet Take 1 tablet by mouth Every 8 (Eight) Hours As Needed for Anxiety. 60 tablet 1   • meloxicam (MOBIC) 15 MG tablet Take 1 tablet by mouth Daily. 90 tablet 3     No facility-administered encounter medications on file as of 3/15/2019.        Reviewed use of high risk medication in the elderly: yes  Reviewed for potential of harmful drug interactions in the elderly: yes    Follow Up:  No Follow-up on file.     An After Visit Summary and PPPS with all of these plans were given to the patient.

## 2019-08-02 ENCOUNTER — OFFICE VISIT (OUTPATIENT)
Dept: FAMILY MEDICINE CLINIC | Facility: CLINIC | Age: 74
End: 2019-08-02

## 2019-08-02 VITALS
HEART RATE: 80 BPM | TEMPERATURE: 98.4 F | SYSTOLIC BLOOD PRESSURE: 118 MMHG | HEIGHT: 66 IN | DIASTOLIC BLOOD PRESSURE: 78 MMHG | BODY MASS INDEX: 23.78 KG/M2 | WEIGHT: 148 LBS

## 2019-08-02 DIAGNOSIS — M15.9 PRIMARY OSTEOARTHRITIS INVOLVING MULTIPLE JOINTS: Chronic | ICD-10-CM

## 2019-08-02 DIAGNOSIS — Z09 HOSPITAL DISCHARGE FOLLOW-UP: Primary | ICD-10-CM

## 2019-08-02 DIAGNOSIS — G90.9 PERIPHERAL AUTONOMIC NEUROPATHY: Chronic | ICD-10-CM

## 2019-08-02 DIAGNOSIS — Z87.81 S/P RIGHT HIP FRACTURE: ICD-10-CM

## 2019-08-02 PROCEDURE — 99214 OFFICE O/P EST MOD 30 MIN: CPT | Performed by: INTERNAL MEDICINE

## 2019-08-02 RX ORDER — ASPIRIN 325 MG
325 TABLET ORAL 2 TIMES DAILY
COMMUNITY

## 2019-08-02 RX ORDER — HYDROCODONE BITARTRATE AND ACETAMINOPHEN 7.5; 325 MG/1; MG/1
1 TABLET ORAL EVERY 6 HOURS PRN
Qty: 30 TABLET | Refills: 0 | Status: SHIPPED | OUTPATIENT
Start: 2019-08-02 | End: 2020-03-16 | Stop reason: SDUPTHER

## 2019-08-02 NOTE — PROGRESS NOTES
Chief Complaint   Patient presents with   • Hospital Discharge Follow-Up     [facility] D/C F/U     Subjective   Eusebia Braden is a 73 y.o. female who presents to the office for a hospital follow-up.  She was admitted to the hospital in [place] on 7/12/2019 and discharged to a skilled nursing facility on 7/18/2019, and then subsequently discharged home on 7/27/2019.  She had a couple of falls and ultimately lost her balance as she was coming out of the bathroom the day prior to her admission.  She fell to the floor and laid there approximately 3 hours before she was able to get help.  She was found to have a closed right hip fracture (femoral neck).  She had surgical repair of the hip fracture on July 12.  She then received physical therapy and occupational therapy postoperatively and was discharged to skilled nursing for continued rehab.  After meeting her therapy goals, she was then discharged home.  She was started on aspirin 325 mg daily.  She was previously taking meloxicam, and this was placed on hold until she completes 2 weeks of aspirin therapy.  All of her other medications remained the same.    She has been doing well since her discharge.  She is having some increased pain in her hip and knees, especially since she is unable to take the meloxicam.  She was not given any pain medication at discharge.  She is asking if she can have something to use for pain in the short-term.    The following portions of the patient's history were reviewed and updated as appropriate: allergies, current medications, past family history, past medical history, past social history, past surgical history and problem list.    Review of Systems   Constitutional: Negative for chills, fatigue and fever.   HENT: Negative for congestion, sneezing, sore throat and trouble swallowing.    Eyes: Negative for visual disturbance.   Respiratory: Negative for cough, chest tightness, shortness of breath and wheezing.    Cardiovascular:  "Negative for chest pain, palpitations and leg swelling.   Gastrointestinal: Negative for abdominal pain, constipation, diarrhea, nausea and vomiting.   Genitourinary: Negative for dysuria, frequency and urgency.   Musculoskeletal: Positive for arthralgias and back pain. Negative for neck pain.   Skin: Negative for rash.   Neurological: Negative for dizziness, weakness and headaches.       Objective   Vitals:    08/02/19 1350   BP: 118/78   BP Location: Left arm   Patient Position: Sitting   Cuff Size: Adult   Pulse: 80   Temp: 98.4 °F (36.9 °C)   TempSrc: Oral   Weight: 67.1 kg (148 lb)   Height: 167.6 cm (66\")   PainSc:   3   PainLoc: Hip     Physical Exam   Constitutional: She is oriented to person, place, and time. She appears well-developed and well-nourished.   HENT:   Head: Normocephalic and atraumatic.   Nose: Nose normal.   Mouth/Throat: Oropharynx is clear and moist. No oropharyngeal exudate.   Eyes: Conjunctivae and EOM are normal. Pupils are equal, round, and reactive to light. Right eye exhibits no discharge. Left eye exhibits no discharge. No scleral icterus.   Neck: Normal range of motion. Neck supple. No thyromegaly present.   Cardiovascular: Normal rate, regular rhythm, normal heart sounds and intact distal pulses. Exam reveals no gallop and no friction rub.   No murmur heard.  Pulmonary/Chest: Effort normal and breath sounds normal. No respiratory distress. She has no wheezes. She has no rales.   Abdominal: Soft. Bowel sounds are normal. She exhibits no distension. There is no tenderness. There is no rebound and no guarding.   Musculoskeletal: She exhibits no edema.        Lumbar back: She exhibits pain.   Lymphadenopathy:     She has no cervical adenopathy.   Neurological: She is alert and oriented to person, place, and time. No cranial nerve deficit.   Skin: Skin is warm and dry. No rash noted.   Psychiatric: She has a normal mood and affect. Her behavior is normal. Judgment and thought content " normal.   Nursing note and vitals reviewed.      Assessment/Plan   Eusebia was seen today for hospital discharge follow-up.    Diagnoses and all orders for this visit:    Hospital discharge follow-up    S/P right hip fracture  -     HYDROcodone-acetaminophen (NORCO) 7.5-325 MG per tablet; Take 1 tablet by mouth Every 6 (Six) Hours As Needed for Moderate Pain .    Primary osteoarthritis involving multiple joints    Peripheral autonomic neuropathy           I obtained records from her hospitalization.  These are reviewed and discussed above in the history of present illness.  Overall, she seems to be doing well.  She will follow-up with orthopedics as scheduled.  She will continue to hold meloxicam for the next couple of weeks while she is taking aspirin.  She will continue with gabapentin for treatment of her neuropathic pain.  For short-term postoperative pain, she is given Norco 7.5/325 mg to take every 6 hours as needed.  She is given 30 tablets with no refills.  She is instructed to hold off on taking lorazepam while she is taking the pain medication.    Patient understands the risks associated with this controlled medication, including tolerance and addiction.  Patient also agrees to only obtain this medication from me, and not from a another provider, unless that provider is covering for me in my absence.  Patient also agrees to be compliant in dosing, and not self adjust the dose of medication.  A signed controlled substance agreement is on file, and the patient has received a controlled substance education sheet at this or a previous visit.  The patient has also signed a consent for treatment with a controlled substance as per [facility] policy. KESHAWN is obtained.    Current outpatient and discharge medications have been reconciled for the patient.  Reviewed by: Trung Collins MD    CONTROLLED SUBSTANCE TRACKING 8/2/2019   Joe Devi 8/2/2019   Report Number [id number]   Last Controlled Substance  Agreement 8/2/2019       PHQ-2/PHQ-9 Depression Screening 3/15/2019   Little interest or pleasure in doing things 0   Feeling down, depressed, or hopeless 0   Trouble falling or staying asleep, or sleeping too much -   Feeling tired or having little energy -   Poor appetite or overeating -   Feeling bad about yourself - or that you are a failure or have let yourself or your family down -   Trouble concentrating on things, such as reading the newspaper or watching television -   Moving or speaking so slowly that other people could have noticed. Or the opposite - being so fidgety or restless that you have been moving around a lot more than usual -   Thoughts that you would be better off dead, or of hurting yourself in some way -   Total Score 0   If you checked off any problems, how difficult have these problems made it for you to do your work, take care of things at home, or get along with other people? -         No visits with results within 2 Month(s) from this visit.   Latest known visit with results is:   Lab on 03/14/2019   Component Date Value Ref Range Status   • Glucose 03/14/2019 97  74 - 99 mg/dL Final   • BUN 03/14/2019 16  7 - 17 mg/dL Final   • Creatinine 03/14/2019 0.74  0.52 - 1.04 mg/dL Final   • Sodium 03/14/2019 141  137 - 145 mmol/L Final   • Potassium 03/14/2019 4.2  3.4 - 5.0 mmol/L Final   • Chloride 03/14/2019 105  98 - 107 mmol/L Final   • CO2 03/14/2019 27.0  22.0 - 30.0 mmol/L Final   • Calcium 03/14/2019 9.3  8.4 - 10.2 mg/dL Final   • Total Protein 03/14/2019 7.0  6.3 - 8.2 g/dL Final   • Albumin 03/14/2019 4.30  3.50 - 5.00 g/dL Final   • ALT (SGPT) 03/14/2019 15  <=35 U/L Final   • AST (SGOT) 03/14/2019 23  14 - 36 U/L Final   • Alkaline Phosphatase 03/14/2019 72  38 - 126 U/L Final   • Total Bilirubin 03/14/2019 1.0  0.2 - 1.3 mg/dL Final   • eGFR Non African Amer 03/14/2019 77  39 - 90 mL/min/1.73 Final   • Globulin 03/14/2019 2.7  2.3 - 3.5 gm/dL Final   • A/G Ratio 03/14/2019 1.6   1.1 - 1.8 g/dL Final   • BUN/Creatinine Ratio 03/14/2019 21.6  7.0 - 25.0 Final   • Anion Gap 03/14/2019 9.0  5.0 - 15.0 mmol/L Final   • Total Cholesterol 03/14/2019 150  150 - 200 mg/dL Final   • Triglycerides 03/14/2019 86  <=150 mg/dL Final   • HDL Cholesterol 03/14/2019 59  40 - 59 mg/dL Final   • LDL Cholesterol  03/14/2019 74  <=100 mg/dL Final   • VLDL Cholesterol 03/14/2019 17.2  mg/dL Final   • LDL/HDL Ratio 03/14/2019 1.25  0.00 - 3.22 Final   • Free T4 03/14/2019 0.80  0.78 - 2.19 ng/dL Final   • TSH 03/14/2019 2.520  0.460 - 4.680 mIU/mL Final   • WBC 03/14/2019 6.36  3.20 - 9.80 10*3/mm3 Final   • RBC 03/14/2019 4.64  3.77 - 5.16 10*6/mm3 Final   • Hemoglobin 03/14/2019 12.9  12.0 - 15.5 g/dL Final   • Hematocrit 03/14/2019 39.6  35.0 - 45.0 % Final   • MCV 03/14/2019 85.3  80.0 - 98.0 fL Final   • MCH 03/14/2019 27.8  26.5 - 34.0 pg Final   • MCHC 03/14/2019 32.6  31.4 - 36.0 g/dL Final   • RDW 03/14/2019 13.1  11.5 - 14.5 % Final   • RDW-SD 03/14/2019 39.9  36.4 - 46.3 fl Final   • MPV 03/14/2019 10.6  8.0 - 12.0 fL Final   • Platelets 03/14/2019 194  150 - 450 10*3/mm3 Final   • Neutrophil % 03/14/2019 64.2  37.0 - 80.0 % Final   • Lymphocyte % 03/14/2019 23.1  10.0 - 50.0 % Final   • Monocyte % 03/14/2019 8.5  0.0 - 12.0 % Final   • Eosinophil % 03/14/2019 3.6  0.0 - 7.0 % Final   • Basophil % 03/14/2019 0.6  0.0 - 2.0 % Final   • Neutrophils, Absolute 03/14/2019 4.08  2.00 - 8.60 10*3/mm3 Final   • Lymphocytes, Absolute 03/14/2019 1.47  0.60 - 4.20 10*3/mm3 Final   • Monocytes, Absolute 03/14/2019 0.54  0.00 - 0.90 10*3/mm3 Final   • Eosinophils, Absolute 03/14/2019 0.23  0.00 - 0.70 10*3/mm3 Final   • Basophils, Absolute 03/14/2019 0.04  0.00 - 0.20 10*3/mm3 Final   ]

## 2019-09-06 ENCOUNTER — LAB (OUTPATIENT)
Dept: LAB | Facility: OTHER | Age: 74
End: 2019-09-06

## 2019-09-06 DIAGNOSIS — E78.2 MIXED HYPERLIPIDEMIA: ICD-10-CM

## 2019-09-06 LAB
ALBUMIN SERPL-MCNC: 4.2 G/DL (ref 3.5–5)
ALBUMIN/GLOB SERPL: 1.5 G/DL (ref 1.1–1.8)
ALP SERPL-CCNC: 85 U/L (ref 38–126)
ALT SERPL W P-5'-P-CCNC: 15 U/L
ANION GAP SERPL CALCULATED.3IONS-SCNC: 10 MMOL/L (ref 5–15)
AST SERPL-CCNC: 24 U/L (ref 14–36)
BACTERIA UR QL AUTO: ABNORMAL /HPF
BASOPHILS # BLD AUTO: 0.04 10*3/MM3 (ref 0–0.2)
BASOPHILS NFR BLD AUTO: 0.6 % (ref 0–1.5)
BILIRUB SERPL-MCNC: 0.6 MG/DL (ref 0.2–1.3)
BILIRUB UR QL STRIP: NEGATIVE
BUN BLD-MCNC: 16 MG/DL (ref 7–23)
BUN/CREAT SERPL: 21.3 (ref 7–25)
CALCIUM SPEC-SCNC: 9.6 MG/DL (ref 8.4–10.2)
CHLORIDE SERPL-SCNC: 104 MMOL/L (ref 101–112)
CLARITY UR: ABNORMAL
CO2 SERPL-SCNC: 28 MMOL/L (ref 22–30)
COLOR UR: YELLOW
CREAT BLD-MCNC: 0.75 MG/DL (ref 0.52–1.04)
DEPRECATED RDW RBC AUTO: 40.8 FL (ref 37–54)
EOSINOPHIL # BLD AUTO: 0.28 10*3/MM3 (ref 0–0.4)
EOSINOPHIL NFR BLD AUTO: 3.9 % (ref 0.3–6.2)
ERYTHROCYTE [DISTWIDTH] IN BLOOD BY AUTOMATED COUNT: 13.2 % (ref 12.3–15.4)
GFR SERPL CREATININE-BSD FRML MDRD: 75 ML/MIN/1.73 (ref 39–90)
GLOBULIN UR ELPH-MCNC: 2.8 GM/DL (ref 2.3–3.5)
GLUCOSE BLD-MCNC: 95 MG/DL (ref 70–99)
GLUCOSE UR STRIP-MCNC: NEGATIVE MG/DL
HCT VFR BLD AUTO: 37.8 % (ref 34–46.6)
HGB BLD-MCNC: 12.1 G/DL (ref 12–15.9)
HGB UR QL STRIP.AUTO: NEGATIVE
HYALINE CASTS UR QL AUTO: ABNORMAL /LPF
KETONES UR QL STRIP: NEGATIVE
LEUKOCYTE ESTERASE UR QL STRIP.AUTO: ABNORMAL
LYMPHOCYTES # BLD AUTO: 1.87 10*3/MM3 (ref 0.7–3.1)
LYMPHOCYTES NFR BLD AUTO: 26.1 % (ref 19.6–45.3)
MCH RBC QN AUTO: 27.6 PG (ref 26.6–33)
MCHC RBC AUTO-ENTMCNC: 32 G/DL (ref 31.5–35.7)
MCV RBC AUTO: 86.3 FL (ref 79–97)
MONOCYTES # BLD AUTO: 0.6 10*3/MM3 (ref 0.1–0.9)
MONOCYTES NFR BLD AUTO: 8.4 % (ref 5–12)
MUCOUS THREADS URNS QL MICRO: ABNORMAL /HPF
NEUTROPHILS # BLD AUTO: 4.37 10*3/MM3 (ref 1.7–7)
NEUTROPHILS NFR BLD AUTO: 61 % (ref 42.7–76)
NITRITE UR QL STRIP: POSITIVE
PH UR STRIP.AUTO: 6 [PH] (ref 5.5–8)
PLATELET # BLD AUTO: 227 10*3/MM3 (ref 140–450)
PMV BLD AUTO: 10.5 FL (ref 6–12)
POTASSIUM BLD-SCNC: 4.3 MMOL/L (ref 3.4–5)
PROT SERPL-MCNC: 7 G/DL (ref 6.3–8.6)
PROT UR QL STRIP: NEGATIVE
RBC # BLD AUTO: 4.38 10*6/MM3 (ref 3.77–5.28)
RBC # UR: ABNORMAL /HPF
REF LAB TEST METHOD: ABNORMAL
SODIUM BLD-SCNC: 142 MMOL/L (ref 137–145)
SP GR UR STRIP: 1.02 (ref 1–1.03)
SQUAMOUS #/AREA URNS HPF: ABNORMAL /HPF
UROBILINOGEN UR QL STRIP: ABNORMAL
WBC NRBC COR # BLD: 7.16 10*3/MM3 (ref 3.4–10.8)
WBC UR QL AUTO: ABNORMAL /HPF

## 2019-09-06 PROCEDURE — 84443 ASSAY THYROID STIM HORMONE: CPT | Performed by: INTERNAL MEDICINE

## 2019-09-06 PROCEDURE — 87086 URINE CULTURE/COLONY COUNT: CPT | Performed by: INTERNAL MEDICINE

## 2019-09-06 PROCEDURE — 36415 COLL VENOUS BLD VENIPUNCTURE: CPT | Performed by: INTERNAL MEDICINE

## 2019-09-06 PROCEDURE — 87186 SC STD MICRODIL/AGAR DIL: CPT | Performed by: INTERNAL MEDICINE

## 2019-09-06 PROCEDURE — 80053 COMPREHEN METABOLIC PANEL: CPT | Performed by: INTERNAL MEDICINE

## 2019-09-06 PROCEDURE — 85025 COMPLETE CBC W/AUTO DIFF WBC: CPT | Performed by: INTERNAL MEDICINE

## 2019-09-06 PROCEDURE — 87088 URINE BACTERIA CULTURE: CPT | Performed by: INTERNAL MEDICINE

## 2019-09-06 PROCEDURE — 81001 URINALYSIS AUTO W/SCOPE: CPT | Performed by: INTERNAL MEDICINE

## 2019-09-06 PROCEDURE — 84439 ASSAY OF FREE THYROXINE: CPT | Performed by: INTERNAL MEDICINE

## 2019-09-06 PROCEDURE — 83721 ASSAY OF BLOOD LIPOPROTEIN: CPT | Performed by: INTERNAL MEDICINE

## 2019-09-07 LAB
ARTICHOKE IGE QN: 88 MG/DL (ref 0–100)
T4 FREE SERPL-MCNC: 0.98 NG/DL (ref 0.93–1.7)
TSH SERPL DL<=0.05 MIU/L-ACNC: 2.07 UIU/ML (ref 0.27–4.2)

## 2019-09-08 LAB — BACTERIA SPEC AEROBE CULT: ABNORMAL

## 2019-09-09 RX ORDER — SULFAMETHOXAZOLE AND TRIMETHOPRIM 800; 160 MG/1; MG/1
1 TABLET ORAL 2 TIMES DAILY
Qty: 14 TABLET | Refills: 0 | Status: SHIPPED | OUTPATIENT
Start: 2019-09-09 | End: 2019-09-16

## 2019-09-16 ENCOUNTER — OFFICE VISIT (OUTPATIENT)
Dept: FAMILY MEDICINE CLINIC | Facility: CLINIC | Age: 74
End: 2019-09-16

## 2019-09-16 VITALS
HEIGHT: 66 IN | HEART RATE: 79 BPM | SYSTOLIC BLOOD PRESSURE: 114 MMHG | WEIGHT: 163 LBS | TEMPERATURE: 98.2 F | DIASTOLIC BLOOD PRESSURE: 82 MMHG | BODY MASS INDEX: 26.2 KG/M2

## 2019-09-16 DIAGNOSIS — Z79.899 DRUG THERAPY: ICD-10-CM

## 2019-09-16 DIAGNOSIS — F41.1 GENERALIZED ANXIETY DISORDER: ICD-10-CM

## 2019-09-16 DIAGNOSIS — M15.9 PRIMARY OSTEOARTHRITIS INVOLVING MULTIPLE JOINTS: Chronic | ICD-10-CM

## 2019-09-16 DIAGNOSIS — E55.9 VITAMIN D DEFICIENCY: Chronic | ICD-10-CM

## 2019-09-16 DIAGNOSIS — G62.9 PERIPHERAL POLYNEUROPATHY: Chronic | ICD-10-CM

## 2019-09-16 DIAGNOSIS — E78.2 MIXED HYPERLIPIDEMIA: Primary | Chronic | ICD-10-CM

## 2019-09-16 PROCEDURE — 99214 OFFICE O/P EST MOD 30 MIN: CPT | Performed by: INTERNAL MEDICINE

## 2019-09-16 RX ORDER — LORAZEPAM 0.5 MG/1
0.5 TABLET ORAL EVERY 8 HOURS PRN
Qty: 60 TABLET | Refills: 1 | Status: SHIPPED | OUTPATIENT
Start: 2019-09-16 | End: 2020-01-16

## 2019-09-16 RX ORDER — ERGOCALCIFEROL 1.25 MG/1
50000 CAPSULE ORAL WEEKLY
Refills: 11 | COMMUNITY
Start: 2019-09-12 | End: 2020-03-16 | Stop reason: SDUPTHER

## 2019-09-16 NOTE — PROGRESS NOTES
Chief Complaint   Patient presents with   • Hyperlipidemia     6 mo f/u with labs     Subjective   Eusebia Braden is a 74 y.o. female who presents to the office for follow-up and review of labs. She has hyperlipidemia and takes Lipitor daily.  She has osteoarthritis which causes low back pain. She takes meloxicam daily which keeps her arthritic pain at a tolerable level.  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  The numbness and tingling radiates into the lower extremities bilaterally. This has been baseline and controlled.  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    She continues to recover from her recent hip fracture.  She is starting to ambulate better, although she still uses a cane when walking around the home.  She was recently diagnosed with vitamin D deficiency, and is taking a weekly dose of ergocalciferol.    The following portions of the patient's history were reviewed and updated as appropriate: allergies, current medications, past family history, past medical history, past social history, past surgical history and problem list.    Review of Systems   Constitutional: Negative for chills, fatigue and fever.   HENT: Negative for congestion, sneezing, sore throat and trouble swallowing.    Eyes: Negative for visual disturbance.   Respiratory: Negative for cough, chest tightness, shortness of breath and wheezing.    Cardiovascular: Negative for chest pain, palpitations and leg swelling.   Gastrointestinal: Negative for abdominal pain, constipation, diarrhea, nausea and vomiting.   Genitourinary: Negative for dysuria, frequency and urgency.   Musculoskeletal: Positive for arthralgias and back pain. Negative for neck pain.   Skin: Negative for rash.   Neurological: Negative for dizziness, weakness and headaches.   Psychiatric/Behavioral: Positive for sleep disturbance. The patient is nervous/anxious.   "      Objective   Vitals:    09/16/19 1126   BP: 114/82   BP Location: Left arm   Patient Position: Sitting   Cuff Size: Adult   Pulse: 79   Temp: 98.2 °F (36.8 °C)   TempSrc: Oral   Weight: 73.9 kg (163 lb)   Height: 167.6 cm (66\")   PainSc:   5   PainLoc: Rib Cage     Physical Exam   Constitutional: She is oriented to person, place, and time. She appears well-developed and well-nourished.   HENT:   Head: Normocephalic and atraumatic.   Nose: Nose normal.   Mouth/Throat: Oropharynx is clear and moist. No oropharyngeal exudate.   Eyes: Conjunctivae and EOM are normal. Pupils are equal, round, and reactive to light. Right eye exhibits no discharge. Left eye exhibits no discharge. No scleral icterus.   Neck: Normal range of motion. Neck supple. No thyromegaly present.   Cardiovascular: Normal rate, regular rhythm, normal heart sounds and intact distal pulses. Exam reveals no gallop and no friction rub.   No murmur heard.  Pulmonary/Chest: Effort normal and breath sounds normal. No respiratory distress. She has no wheezes. She has no rales.   Abdominal: Soft. Bowel sounds are normal. She exhibits no distension. There is no tenderness. There is no rebound and no guarding.   Musculoskeletal: She exhibits no edema.        Lumbar back: She exhibits pain.   Lymphadenopathy:     She has no cervical adenopathy.   Neurological: She is alert and oriented to person, place, and time. No cranial nerve deficit.   Skin: Skin is warm and dry. No rash noted.   Psychiatric: She has a normal mood and affect. Her behavior is normal. Judgment and thought content normal.   Nursing note and vitals reviewed.      Assessment/Plan   Eusebia was seen today for hyperlipidemia.    Diagnoses and all orders for this visit:    Mixed hyperlipidemia  -     CBC & Differential; Future  -     Comprehensive Metabolic Panel; Future  -     T4, Free; Future  -     TSH; Future  -     Urinalysis With Culture If Indicated -; Future  -     Lipid Panel; " Future    Peripheral polyneuropathy    Primary osteoarthritis involving multiple joints    Generalized anxiety disorder  -     LORazepam (ATIVAN) 0.5 MG tablet; Take 1 tablet by mouth Every 8 (Eight) Hours As Needed for Anxiety.    Drug therapy  -     ToxASSURE Select 13 Discrete -; Future    Vitamin D deficiency  -     Vitamin D 25 Hydroxy; Future         Labs are reviewed with patient.  Overall her labs look good.  Her LDL is at goal, and she will continue with Lipitor.  She will continue with meloxicam for treatment of the osteoarthritis.  She will continue with Neurontin for treatment of the neuropathic pain.  She will continue with citalopram daily and lorazepam as needed for anxiety symptoms related to the grief reaction.    Patient understands the risks associated with this controlled medication, including tolerance and addiction.  Patient also agrees to only obtain this medication from me, and not from a another provider, unless that provider is covering for me in my absence.  Patient also agrees to be compliant in dosing, and not self adjust the dose of medication.  A signed controlled substance agreement is on file, and the patient has received a controlled substance education sheet at this a previous visit.  The patient has also signed a consent for treatment with a controlled substance as per [facility] policy. KESHAWN was obtained.    CONTROLLED SUBSTANCE TRACKING 8/2/2019 9/16/2019   Last Keshawn 8/2/2019 9/16/2019   Report Number [id number]   Last Controlled Substance Agreement 8/2/2019 9/16/2019         PHQ-2/PHQ-9 Depression Screening 3/15/2019   Little interest or pleasure in doing things 0   Feeling down, depressed, or hopeless 0   Trouble falling or staying asleep, or sleeping too much -   Feeling tired or having little energy -   Poor appetite or overeating -   Feeling bad about yourself - or that you are a failure or have let yourself or your family down -   Trouble concentrating on  things, such as reading the newspaper or watching television -   Moving or speaking so slowly that other people could have noticed. Or the opposite - being so fidgety or restless that you have been moving around a lot more than usual -   Thoughts that you would be better off dead, or of hurting yourself in some way -   Total Score 0   If you checked off any problems, how difficult have these problems made it for you to do your work, take care of things at home, or get along with other people? -         Lab on 09/06/2019   Component Date Value Ref Range Status   • Glucose 09/06/2019 95  70 - 99 mg/dL Final   • BUN 09/06/2019 16  7 - 23 mg/dL Final   • Creatinine 09/06/2019 0.75  0.52 - 1.04 mg/dL Final   • Sodium 09/06/2019 142  137 - 145 mmol/L Final   • Potassium 09/06/2019 4.3  3.4 - 5.0 mmol/L Final   • Chloride 09/06/2019 104  101 - 112 mmol/L Final   • CO2 09/06/2019 28.0  22.0 - 30.0 mmol/L Final   • Calcium 09/06/2019 9.6  8.4 - 10.2 mg/dL Final   • Total Protein 09/06/2019 7.0  6.3 - 8.6 g/dL Final   • Albumin 09/06/2019 4.20  3.50 - 5.00 g/dL Final   • ALT (SGPT) 09/06/2019 15  <=35 U/L Final   • AST (SGOT) 09/06/2019 24  14 - 36 U/L Final   • Alkaline Phosphatase 09/06/2019 85  38 - 126 U/L Final   • Total Bilirubin 09/06/2019 0.6  0.2 - 1.3 mg/dL Final   • eGFR Non  Amer 09/06/2019 75  39 - 90 mL/min/1.73 Final   • Globulin 09/06/2019 2.8  2.3 - 3.5 gm/dL Final   • A/G Ratio 09/06/2019 1.5  1.1 - 1.8 g/dL Final   • BUN/Creatinine Ratio 09/06/2019 21.3  7.0 - 25.0 Final   • Anion Gap 09/06/2019 10.0  5.0 - 15.0 mmol/L Final   • Free T4 09/06/2019 0.98  0.93 - 1.70 ng/dL Final   • TSH 09/06/2019 2.070  0.270 - 4.200 uIU/mL Final   • Color, UA 09/06/2019 Yellow  Yellow, Straw Final   • Appearance, UA 09/06/2019 Cloudy* Clear Final   • pH, UA 09/06/2019 6.0  5.5 - 8.0 Final   • Specific Gravity, UA 09/06/2019 1.025  1.005 - 1.030 Final   • Glucose, UA 09/06/2019 Negative  Negative Final   • Ketones, UA  09/06/2019 Negative  Negative Final   • Bilirubin, UA 09/06/2019 Negative  Negative Final   • Blood, UA 09/06/2019 Negative  Negative Final   • Protein, UA 09/06/2019 Negative  Negative Final   • Leuk Esterase, UA 09/06/2019 Moderate (2+)* Negative Final   • Nitrite, UA 09/06/2019 Positive* Negative Final   • Urobilinogen, UA 09/06/2019 0.2 E.U./dL  0.2 - 1.0 E.U./dL Final   • LDL Cholesterol  09/06/2019 88  0 - 100 mg/dL Final   • WBC 09/06/2019 7.16  3.40 - 10.80 10*3/mm3 Final   • RBC 09/06/2019 4.38  3.77 - 5.28 10*6/mm3 Final   • Hemoglobin 09/06/2019 12.1  12.0 - 15.9 g/dL Final   • Hematocrit 09/06/2019 37.8  34.0 - 46.6 % Final   • MCV 09/06/2019 86.3  79.0 - 97.0 fL Final   • MCH 09/06/2019 27.6  26.6 - 33.0 pg Final   • MCHC 09/06/2019 32.0  31.5 - 35.7 g/dL Final   • RDW 09/06/2019 13.2  12.3 - 15.4 % Final   • RDW-SD 09/06/2019 40.8  37.0 - 54.0 fl Final   • MPV 09/06/2019 10.5  6.0 - 12.0 fL Final   • Platelets 09/06/2019 227  140 - 450 10*3/mm3 Final   • Neutrophil % 09/06/2019 61.0  42.7 - 76.0 % Final   • Lymphocyte % 09/06/2019 26.1  19.6 - 45.3 % Final   • Monocyte % 09/06/2019 8.4  5.0 - 12.0 % Final   • Eosinophil % 09/06/2019 3.9  0.3 - 6.2 % Final   • Basophil % 09/06/2019 0.6  0.0 - 1.5 % Final   • Neutrophils, Absolute 09/06/2019 4.37  1.70 - 7.00 10*3/mm3 Final   • Lymphocytes, Absolute 09/06/2019 1.87  0.70 - 3.10 10*3/mm3 Final   • Monocytes, Absolute 09/06/2019 0.60  0.10 - 0.90 10*3/mm3 Final   • Eosinophils, Absolute 09/06/2019 0.28  0.00 - 0.40 10*3/mm3 Final   • Basophils, Absolute 09/06/2019 0.04  0.00 - 0.20 10*3/mm3 Final   • RBC,  09/06/2019 0-2* None Seen /HPF Final   • WBC,  09/06/2019 31-50* None Seen /HPF Final   • Bacteria,  09/06/2019 3+* None Seen /HPF Final   • Squamous Epithelial Cells,  09/06/2019 3-6* None Seen, 0-2 /HPF Final   • Hyaline Casts,  09/06/2019 None Seen  None Seen /LPF Final   • Mucus,  09/06/2019 Small/1+* None Seen, Trace /HPF Final   •  Methodology 09/06/2019 Manual Light Microscopy   Final   • Urine Culture 09/06/2019 >100,000 CFU/mL Escherichia coli*  Final   ].  .

## 2019-09-16 NOTE — PATIENT INSTRUCTIONS

## 2019-11-11 DIAGNOSIS — G90.9 PERIPHERAL AUTONOMIC NEUROPATHY: Chronic | ICD-10-CM

## 2019-11-11 RX ORDER — GABAPENTIN 300 MG/1
CAPSULE ORAL
Qty: 360 CAPSULE | Refills: 0 | Status: SHIPPED | OUTPATIENT
Start: 2019-11-11 | End: 2020-01-16

## 2019-11-11 NOTE — TELEPHONE ENCOUNTER
Last office visit, KESHAWN and controlled consent was on  9-16-19. Next office visit is scheduled for 3-16-19. Last refill of gabapentin was on 3-15-20 for a 3 month supply with 1 refill.

## 2020-01-16 DIAGNOSIS — F41.1 GENERALIZED ANXIETY DISORDER: ICD-10-CM

## 2020-01-16 DIAGNOSIS — G90.9 PERIPHERAL AUTONOMIC NEUROPATHY: Chronic | ICD-10-CM

## 2020-01-16 RX ORDER — GABAPENTIN 300 MG/1
CAPSULE ORAL
Qty: 360 CAPSULE | Refills: 0 | Status: SHIPPED | OUTPATIENT
Start: 2020-01-16 | End: 2020-03-16 | Stop reason: SDUPTHER

## 2020-01-16 RX ORDER — LORAZEPAM 0.5 MG/1
0.5 TABLET ORAL EVERY 8 HOURS PRN
Qty: 60 TABLET | Refills: 0 | Status: SHIPPED | OUTPATIENT
Start: 2020-01-16 | End: 2020-03-16 | Stop reason: SDUPTHER

## 2020-01-16 NOTE — TELEPHONE ENCOUNTER
Patient is up to date on controlled medication consent, Marito report, and appt from 09/16/2019. Next appt is on 03/16/2020.

## 2020-03-13 ENCOUNTER — LAB (OUTPATIENT)
Dept: LAB | Facility: OTHER | Age: 75
End: 2020-03-13

## 2020-03-13 DIAGNOSIS — E78.2 MIXED HYPERLIPIDEMIA: ICD-10-CM

## 2020-03-13 DIAGNOSIS — E55.9 VITAMIN D DEFICIENCY: Chronic | ICD-10-CM

## 2020-03-13 DIAGNOSIS — Z79.899 DRUG THERAPY: ICD-10-CM

## 2020-03-13 LAB
ALBUMIN SERPL-MCNC: 3.9 G/DL (ref 3.5–5)
ALBUMIN/GLOB SERPL: 1.2 G/DL (ref 1.1–1.8)
ALP SERPL-CCNC: 69 U/L (ref 38–126)
ALT SERPL W P-5'-P-CCNC: 17 U/L
ANION GAP SERPL CALCULATED.3IONS-SCNC: 8 MMOL/L (ref 5–15)
AST SERPL-CCNC: 30 U/L (ref 14–36)
BACTERIA UR QL AUTO: ABNORMAL /HPF
BASOPHILS # BLD AUTO: 0.06 10*3/MM3 (ref 0–0.2)
BASOPHILS NFR BLD AUTO: 0.8 % (ref 0–1.5)
BILIRUB SERPL-MCNC: 0.7 MG/DL (ref 0.2–1.3)
BILIRUB UR QL STRIP: NEGATIVE
BUN BLD-MCNC: 19 MG/DL (ref 7–23)
BUN/CREAT SERPL: 26 (ref 7–25)
CALCIUM SPEC-SCNC: 9.5 MG/DL (ref 8.4–10.2)
CHLORIDE SERPL-SCNC: 106 MMOL/L (ref 101–112)
CHOLEST SERPL-MCNC: 149 MG/DL (ref 150–200)
CLARITY UR: ABNORMAL
CO2 SERPL-SCNC: 28 MMOL/L (ref 22–30)
COLOR UR: YELLOW
CREAT BLD-MCNC: 0.73 MG/DL (ref 0.52–1.04)
DEPRECATED RDW RBC AUTO: 40.7 FL (ref 37–54)
EOSINOPHIL # BLD AUTO: 0.31 10*3/MM3 (ref 0–0.4)
EOSINOPHIL NFR BLD AUTO: 4.4 % (ref 0.3–6.2)
ERYTHROCYTE [DISTWIDTH] IN BLOOD BY AUTOMATED COUNT: 13.1 % (ref 12.3–15.4)
GFR SERPL CREATININE-BSD FRML MDRD: 78 ML/MIN/1.73 (ref 39–90)
GLOBULIN UR ELPH-MCNC: 3.2 GM/DL (ref 2.3–3.5)
GLUCOSE BLD-MCNC: 99 MG/DL (ref 70–99)
GLUCOSE UR STRIP-MCNC: NEGATIVE MG/DL
HCT VFR BLD AUTO: 39.9 % (ref 34–46.6)
HDLC SERPL-MCNC: 57 MG/DL (ref 40–59)
HGB BLD-MCNC: 12.8 G/DL (ref 12–15.9)
HGB UR QL STRIP.AUTO: NEGATIVE
HYALINE CASTS UR QL AUTO: ABNORMAL /LPF
KETONES UR QL STRIP: NEGATIVE
LDLC SERPL CALC-MCNC: 68 MG/DL
LDLC/HDLC SERPL: 1.19 {RATIO} (ref 0–3.22)
LEUKOCYTE ESTERASE UR QL STRIP.AUTO: ABNORMAL
LYMPHOCYTES # BLD AUTO: 1.75 10*3/MM3 (ref 0.7–3.1)
LYMPHOCYTES NFR BLD AUTO: 24.7 % (ref 19.6–45.3)
MCH RBC QN AUTO: 27.7 PG (ref 26.6–33)
MCHC RBC AUTO-ENTMCNC: 32.1 G/DL (ref 31.5–35.7)
MCV RBC AUTO: 86.4 FL (ref 79–97)
MONOCYTES # BLD AUTO: 0.56 10*3/MM3 (ref 0.1–0.9)
MONOCYTES NFR BLD AUTO: 7.9 % (ref 5–12)
MUCOUS THREADS URNS QL MICRO: ABNORMAL /HPF
NEUTROPHILS # BLD AUTO: 4.41 10*3/MM3 (ref 1.7–7)
NEUTROPHILS NFR BLD AUTO: 62.2 % (ref 42.7–76)
NITRITE UR QL STRIP: NEGATIVE
PH UR STRIP.AUTO: 7 [PH] (ref 5.5–8)
PLATELET # BLD AUTO: 213 10*3/MM3 (ref 140–450)
PMV BLD AUTO: 10.3 FL (ref 6–12)
POTASSIUM BLD-SCNC: 4.4 MMOL/L (ref 3.4–5)
PROT SERPL-MCNC: 7.1 G/DL (ref 6.3–8.6)
PROT UR QL STRIP: NEGATIVE
RBC # BLD AUTO: 4.62 10*6/MM3 (ref 3.77–5.28)
RBC # UR: ABNORMAL /HPF
REF LAB TEST METHOD: ABNORMAL
SODIUM BLD-SCNC: 142 MMOL/L (ref 137–145)
SP GR UR STRIP: 1.02 (ref 1–1.03)
SQUAMOUS #/AREA URNS HPF: ABNORMAL /HPF
TRIGL SERPL-MCNC: 120 MG/DL
UROBILINOGEN UR QL STRIP: ABNORMAL
VLDLC SERPL-MCNC: 24 MG/DL
WBC NRBC COR # BLD: 7.09 10*3/MM3 (ref 3.4–10.8)
WBC UR QL AUTO: ABNORMAL /HPF

## 2020-03-13 PROCEDURE — 87086 URINE CULTURE/COLONY COUNT: CPT | Performed by: INTERNAL MEDICINE

## 2020-03-13 PROCEDURE — 80061 LIPID PANEL: CPT | Performed by: INTERNAL MEDICINE

## 2020-03-13 PROCEDURE — 81001 URINALYSIS AUTO W/SCOPE: CPT | Performed by: INTERNAL MEDICINE

## 2020-03-13 PROCEDURE — 82306 VITAMIN D 25 HYDROXY: CPT | Performed by: INTERNAL MEDICINE

## 2020-03-13 PROCEDURE — G0481 DRUG TEST DEF 8-14 CLASSES: HCPCS | Performed by: INTERNAL MEDICINE

## 2020-03-13 PROCEDURE — 85025 COMPLETE CBC W/AUTO DIFF WBC: CPT | Performed by: INTERNAL MEDICINE

## 2020-03-13 PROCEDURE — 80307 DRUG TEST PRSMV CHEM ANLYZR: CPT | Performed by: INTERNAL MEDICINE

## 2020-03-13 PROCEDURE — 84443 ASSAY THYROID STIM HORMONE: CPT | Performed by: INTERNAL MEDICINE

## 2020-03-13 PROCEDURE — 80053 COMPREHEN METABOLIC PANEL: CPT | Performed by: INTERNAL MEDICINE

## 2020-03-13 PROCEDURE — 36415 COLL VENOUS BLD VENIPUNCTURE: CPT | Performed by: INTERNAL MEDICINE

## 2020-03-13 PROCEDURE — 84439 ASSAY OF FREE THYROXINE: CPT | Performed by: INTERNAL MEDICINE

## 2020-03-14 LAB
25(OH)D3 SERPL-MCNC: 53.4 NG/ML (ref 30–100)
BACTERIA SPEC AEROBE CULT: NO GROWTH
T4 FREE SERPL-MCNC: 0.99 NG/DL (ref 0.93–1.7)
TSH SERPL DL<=0.05 MIU/L-ACNC: 2.21 UIU/ML (ref 0.27–4.2)

## 2020-03-16 ENCOUNTER — OFFICE VISIT (OUTPATIENT)
Dept: FAMILY MEDICINE CLINIC | Facility: CLINIC | Age: 75
End: 2020-03-16

## 2020-03-16 VITALS
TEMPERATURE: 98.6 F | SYSTOLIC BLOOD PRESSURE: 118 MMHG | HEIGHT: 66 IN | BODY MASS INDEX: 26.2 KG/M2 | OXYGEN SATURATION: 98 % | DIASTOLIC BLOOD PRESSURE: 72 MMHG | HEART RATE: 68 BPM | WEIGHT: 163 LBS

## 2020-03-16 DIAGNOSIS — G62.9 PERIPHERAL POLYNEUROPATHY: Chronic | ICD-10-CM

## 2020-03-16 DIAGNOSIS — M15.9 PRIMARY OSTEOARTHRITIS INVOLVING MULTIPLE JOINTS: Chronic | ICD-10-CM

## 2020-03-16 DIAGNOSIS — Z87.81 S/P RIGHT HIP FRACTURE: ICD-10-CM

## 2020-03-16 DIAGNOSIS — Z00.00 MEDICARE ANNUAL WELLNESS VISIT, SUBSEQUENT: Primary | ICD-10-CM

## 2020-03-16 DIAGNOSIS — G90.9 PERIPHERAL AUTONOMIC NEUROPATHY: Chronic | ICD-10-CM

## 2020-03-16 DIAGNOSIS — E55.9 VITAMIN D DEFICIENCY: Chronic | ICD-10-CM

## 2020-03-16 DIAGNOSIS — E78.2 MIXED HYPERLIPIDEMIA: Chronic | ICD-10-CM

## 2020-03-16 DIAGNOSIS — F41.1 GENERALIZED ANXIETY DISORDER: Chronic | ICD-10-CM

## 2020-03-16 PROCEDURE — G0439 PPPS, SUBSEQ VISIT: HCPCS | Performed by: INTERNAL MEDICINE

## 2020-03-16 RX ORDER — CITALOPRAM 20 MG/1
20 TABLET ORAL DAILY
Qty: 90 TABLET | Refills: 3 | Status: SHIPPED | OUTPATIENT
Start: 2020-03-16 | End: 2021-03-02

## 2020-03-16 RX ORDER — ERGOCALCIFEROL 1.25 MG/1
50000 CAPSULE ORAL WEEKLY
Qty: 12 CAPSULE | Refills: 3 | Status: SHIPPED | OUTPATIENT
Start: 2020-03-16 | End: 2021-03-02

## 2020-03-16 RX ORDER — ATORVASTATIN CALCIUM 20 MG/1
20 TABLET, FILM COATED ORAL NIGHTLY
Qty: 90 TABLET | Refills: 3 | Status: SHIPPED | OUTPATIENT
Start: 2020-03-16 | End: 2021-03-16 | Stop reason: SDUPTHER

## 2020-03-16 RX ORDER — HYDROCODONE BITARTRATE AND ACETAMINOPHEN 7.5; 325 MG/1; MG/1
1 TABLET ORAL EVERY 6 HOURS PRN
Qty: 30 TABLET | Refills: 0 | Status: SHIPPED | OUTPATIENT
Start: 2020-03-16 | End: 2021-03-16

## 2020-03-16 RX ORDER — LORAZEPAM 0.5 MG/1
0.5 TABLET ORAL EVERY 8 HOURS PRN
Qty: 60 TABLET | Refills: 1 | Status: SHIPPED | OUTPATIENT
Start: 2020-03-16 | End: 2020-07-28 | Stop reason: SDUPTHER

## 2020-03-16 RX ORDER — MELOXICAM 15 MG/1
15 TABLET ORAL DAILY
Qty: 90 TABLET | Refills: 3 | Status: SHIPPED | OUTPATIENT
Start: 2020-03-16 | End: 2021-03-16 | Stop reason: SDUPTHER

## 2020-03-16 RX ORDER — GABAPENTIN 300 MG/1
CAPSULE ORAL
Qty: 360 CAPSULE | Refills: 1 | Status: SHIPPED | OUTPATIENT
Start: 2020-03-16 | End: 2020-09-17 | Stop reason: SDUPTHER

## 2020-03-16 NOTE — PROGRESS NOTES
Chief Complaint   Patient presents with   • Medicare Wellness-subsequent     6 month f/u on labs,    • Osteoarthritis     patient states that she has problems with both knees      Subjective   Eusebia Braden is a 74 y.o. female who presents to the office for follow-up and review of labs. She has hyperlipidemia and takes Lipitor daily.  She has osteoarthritis which causes low back pain. She takes meloxicam daily which keeps her arthritic pain at a tolerable level.  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  The numbness and tingling radiates into the lower extremities bilaterally. This has been baseline and controlled.  She takes Norco 7.5/325 mg very sparingly as needed for flareups in her arthritic pain.  A single prescription will usually last her for several months.  She has recently been having increased pain and stiffness in her knees.  We have discussed orthopedic referral in the past, but she is not yet ready to proceed with this.  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  She has vitamin D deficiency, and takes a weekly dose of ergocalciferol.    The following portions of the patient's history were reviewed and updated as appropriate: allergies, current medications, past family history, past medical history, past social history, past surgical history and problem list.    Review of Systems   Constitutional: Negative for chills, fatigue and fever.   HENT: Negative for congestion, sneezing, sore throat and trouble swallowing.    Eyes: Negative for visual disturbance.   Respiratory: Negative for cough, chest tightness, shortness of breath and wheezing.    Cardiovascular: Negative for chest pain, palpitations and leg swelling.   Gastrointestinal: Negative for abdominal pain, constipation, diarrhea, nausea and vomiting.   Genitourinary: Negative for dysuria, frequency and urgency.   Musculoskeletal: Positive for  "arthralgias and back pain. Negative for neck pain.   Skin: Negative for rash.   Neurological: Negative for dizziness, weakness and headaches.       Objective   Vitals:    03/16/20 1135   BP: 118/72   Pulse: 68   Temp: 98.6 °F (37 °C)   TempSrc: Oral   SpO2: 98%   Weight: 73.9 kg (163 lb)   Height: 167.6 cm (66\")   PainSc:   4   PainLoc: Generalized      Body mass index is 26.31 kg/m².    Physical Exam   Constitutional: She is oriented to person, place, and time. She appears well-developed and well-nourished.   HENT:   Head: Normocephalic and atraumatic.   Nose: Nose normal.   Mouth/Throat: Oropharynx is clear and moist. No oropharyngeal exudate.   Eyes: Pupils are equal, round, and reactive to light. Conjunctivae and EOM are normal. Right eye exhibits no discharge. Left eye exhibits no discharge. No scleral icterus.   Neck: Normal range of motion. Neck supple. No thyromegaly present.   Cardiovascular: Normal rate, regular rhythm, normal heart sounds and intact distal pulses. Exam reveals no gallop and no friction rub.   No murmur heard.  Pulmonary/Chest: Effort normal and breath sounds normal. No respiratory distress. She has no wheezes. She has no rales.   Abdominal: Soft. Bowel sounds are normal. She exhibits no distension. There is no tenderness. There is no rebound and no guarding.   Musculoskeletal: She exhibits no edema.        Lumbar back: She exhibits pain.   Lymphadenopathy:     She has no cervical adenopathy.   Neurological: She is alert and oriented to person, place, and time. No cranial nerve deficit.   Skin: Skin is warm and dry. No rash noted.   Psychiatric: She has a normal mood and affect. Her behavior is normal. Judgment and thought content normal.   Nursing note and vitals reviewed.      Assessment/Plan   Eusebia was seen today for medicare wellness-subsequent and osteoarthritis.    Diagnoses and all orders for this visit:    Medicare annual wellness visit, subsequent    Mixed hyperlipidemia  -     " CBC & Differential; Future  -     Comprehensive Metabolic Panel; Future  -     T4, Free; Future  -     TSH; Future  -     LDL Cholesterol, Direct; Future  -     atorvastatin (LIPITOR) 20 MG tablet; Take 1 tablet by mouth Every Night.    Peripheral polyneuropathy    Primary osteoarthritis involving multiple joints  -     meloxicam (MOBIC) 15 MG tablet; Take 1 tablet by mouth Daily.    Vitamin D deficiency    Generalized anxiety disorder  -     citalopram (CeleXA) 20 MG tablet; Take 1 tablet by mouth Daily.  -     LORazepam (ATIVAN) 0.5 MG tablet; Take 1 tablet by mouth Every 8 (Eight) Hours As Needed for Anxiety.    Peripheral autonomic neuropathy  -     gabapentin (NEURONTIN) 300 MG capsule; 1 cap each morning and afternoon, and 2 caps at bedtime.    S/P right hip fracture  -     HYDROcodone-acetaminophen (NORCO) 7.5-325 MG per tablet; Take 1 tablet by mouth Every 6 (Six) Hours As Needed for Moderate Pain .    Other orders  -     vitamin D (ERGOCALCIFEROL) 1.25 MG (79795 UT) capsule capsule; Take 1 capsule by mouth 1 (One) Time Per Week.         Labs are reviewed with patient.  Overall her labs look good.  Her lipids are at goal, and she will continue with Lipitor.  She will continue with meloxicam for treatment of the osteoarthritis.  She will continue with Norco 7.5/325 mg every 6 hours as needed for the arthritic/knee pain.  She will use this sparingly.  She will continue with gabapentin for treatment of the neuropathic pain.  She will continue with citalopram daily and lorazepam as needed for anxiety and depression.    Patient understands the risks associated with this controlled medication, including tolerance and addiction.  Patient also agrees to only obtain this medication from me, and not from a another provider, unless that provider is covering for me in my absence.  Patient also agrees to be compliant in dosing, and not self adjust the dose of medication.  A signed controlled substance agreement is on  file, and the patient has received a controlled substance education sheet at this a previous visit.  The patient has also signed a consent for treatment with a controlled substance as per [facility] policy. KESHAWN was obtained.    CONTROLLED SUBSTANCE TRACKING 8/2/2019 9/16/2019 3/16/2020   Last Keshawn 8/2/2019 9/16/2019 3/16/2020   Report Number [id number]   Last Controlled Substance Agreement 8/2/2019 9/16/2019 3/16/2020         PHQ-2/PHQ-9 Depression Screening 3/16/2020   Little interest or pleasure in doing things 0   Feeling down, depressed, or hopeless 2   Trouble falling or staying asleep, or sleeping too much 1   Feeling tired or having little energy 1   Poor appetite or overeating 0   Feeling bad about yourself - or that you are a failure or have let yourself or your family down 0   Trouble concentrating on things, such as reading the newspaper or watching television 0   Moving or speaking so slowly that other people could have noticed. Or the opposite - being so fidgety or restless that you have been moving around a lot more than usual 0   Thoughts that you would be better off dead, or of hurting yourself in some way 0   Total Score 4   If you checked off any problems, how difficult have these problems made it for you to do your work, take care of things at home, or get along with other people? Somewhat difficult         Lab on 03/13/2020   Component Date Value Ref Range Status   • Glucose 03/13/2020 99  70 - 99 mg/dL Final   • BUN 03/13/2020 19  7 - 23 mg/dL Final   • Creatinine 03/13/2020 0.73  0.52 - 1.04 mg/dL Final   • Sodium 03/13/2020 142  137 - 145 mmol/L Final   • Potassium 03/13/2020 4.4  3.4 - 5.0 mmol/L Final   • Chloride 03/13/2020 106  101 - 112 mmol/L Final   • CO2 03/13/2020 28.0  22.0 - 30.0 mmol/L Final   • Calcium 03/13/2020 9.5  8.4 - 10.2 mg/dL Final   • Total Protein 03/13/2020 7.1  6.3 - 8.6 g/dL Final   • Albumin 03/13/2020 3.90  3.50 - 5.00 g/dL Final   • ALT  (SGPT) 03/13/2020 17  <=35 U/L Final   • AST (SGOT) 03/13/2020 30  14 - 36 U/L Final   • Alkaline Phosphatase 03/13/2020 69  38 - 126 U/L Final   • Total Bilirubin 03/13/2020 0.7  0.2 - 1.3 mg/dL Final   • eGFR Non African Amer 03/13/2020 78  39 - 90 mL/min/1.73 Final   • Globulin 03/13/2020 3.2  2.3 - 3.5 gm/dL Final   • A/G Ratio 03/13/2020 1.2  1.1 - 1.8 g/dL Final   • BUN/Creatinine Ratio 03/13/2020 26.0* 7.0 - 25.0 Final   • Anion Gap 03/13/2020 8.0  5.0 - 15.0 mmol/L Final   • Free T4 03/13/2020 0.99  0.93 - 1.70 ng/dL Final   • TSH 03/13/2020 2.210  0.270 - 4.200 uIU/mL Final   • Color, UA 03/13/2020 Yellow  Yellow, Straw Final   • Appearance, UA 03/13/2020 Cloudy* Clear Final   • pH, UA 03/13/2020 7.0  5.5 - 8.0 Final   • Specific Gravity, UA 03/13/2020 1.020  1.005 - 1.030 Final   • Glucose, UA 03/13/2020 Negative  Negative Final   • Ketones, UA 03/13/2020 Negative  Negative Final   • Bilirubin, UA 03/13/2020 Negative  Negative Final   • Blood, UA 03/13/2020 Negative  Negative Final   • Protein, UA 03/13/2020 Negative  Negative Final   • Leuk Esterase, UA 03/13/2020 Small (1+)* Negative Final   • Nitrite, UA 03/13/2020 Negative  Negative Final   • Urobilinogen, UA 03/13/2020 1.0 E.U./dL  0.2 - 1.0 E.U./dL Final   • Total Cholesterol 03/13/2020 149* 150 - 200 mg/dL Final   • Triglycerides 03/13/2020 120  <=150 mg/dL Final   • HDL Cholesterol 03/13/2020 57  40 - 59 mg/dL Final   • LDL Cholesterol  03/13/2020 68  <=100 mg/dL Final   • VLDL Cholesterol 03/13/2020 24  mg/dL Final   • LDL/HDL Ratio 03/13/2020 1.19  0.00 - 3.22 Final   • 25 Hydroxy, Vitamin D 03/13/2020 53.4  30.0 - 100.0 ng/ml Final   • WBC 03/13/2020 7.09  3.40 - 10.80 10*3/mm3 Final   • RBC 03/13/2020 4.62  3.77 - 5.28 10*6/mm3 Final   • Hemoglobin 03/13/2020 12.8  12.0 - 15.9 g/dL Final   • Hematocrit 03/13/2020 39.9  34.0 - 46.6 % Final   • MCV 03/13/2020 86.4  79.0 - 97.0 fL Final   • MCH 03/13/2020 27.7  26.6 - 33.0 pg Final   • MCHC  03/13/2020 32.1  31.5 - 35.7 g/dL Final   • RDW 03/13/2020 13.1  12.3 - 15.4 % Final   • RDW-SD 03/13/2020 40.7  37.0 - 54.0 fl Final   • MPV 03/13/2020 10.3  6.0 - 12.0 fL Final   • Platelets 03/13/2020 213  140 - 450 10*3/mm3 Final   • Neutrophil % 03/13/2020 62.2  42.7 - 76.0 % Final   • Lymphocyte % 03/13/2020 24.7  19.6 - 45.3 % Final   • Monocyte % 03/13/2020 7.9  5.0 - 12.0 % Final   • Eosinophil % 03/13/2020 4.4  0.3 - 6.2 % Final   • Basophil % 03/13/2020 0.8  0.0 - 1.5 % Final   • Neutrophils, Absolute 03/13/2020 4.41  1.70 - 7.00 10*3/mm3 Final   • Lymphocytes, Absolute 03/13/2020 1.75  0.70 - 3.10 10*3/mm3 Final   • Monocytes, Absolute 03/13/2020 0.56  0.10 - 0.90 10*3/mm3 Final   • Eosinophils, Absolute 03/13/2020 0.31  0.00 - 0.40 10*3/mm3 Final   • Basophils, Absolute 03/13/2020 0.06  0.00 - 0.20 10*3/mm3 Final   • RBC, UA 03/13/2020 0-2* None Seen /HPF Final   • WBC, UA 03/13/2020 21-30* None Seen /HPF Final   • Bacteria, UA 03/13/2020 Trace* None Seen /HPF Final   • Squamous Epithelial Cells, UA 03/13/2020 0-2  None Seen, 0-2 /HPF Final   • Hyaline Casts, UA 03/13/2020 None Seen  None Seen /LPF Final   • Mucus, UA 03/13/2020 Small/1+* None Seen, Trace /HPF Final   • Methodology 03/13/2020 Manual Light Microscopy   Final   • Urine Culture 03/13/2020 No growth   Final   ].  .  .

## 2020-03-16 NOTE — PATIENT INSTRUCTIONS

## 2020-03-16 NOTE — PROGRESS NOTES
QUICK REFERENCE INFORMATION:  The ABCs of the Annual Wellness Visit    Subsequent Medicare Wellness Visit    HEALTH RISK ASSESSMENT    1945    Recent Hospitalizations:  Recently treated at the following:  Other: [facility].        Current Medical Providers:  Patient Care Team:  Trung Collins MD as PCP - General (Family Medicine)        Smoking Status:  Social History     Tobacco Use   Smoking Status Former Smoker   • Years: 20.00   • Last attempt to quit: 2011   • Years since quittin.5   Smokeless Tobacco Never Used       Alcohol Consumption:  Social History     Substance and Sexual Activity   Alcohol Use No       Depression Screen:   PHQ-2/PHQ-9 Depression Screening 3/16/2020   Little interest or pleasure in doing things 0   Feeling down, depressed, or hopeless 2   Trouble falling or staying asleep, or sleeping too much 1   Feeling tired or having little energy 1   Poor appetite or overeating 0   Feeling bad about yourself - or that you are a failure or have let yourself or your family down 0   Trouble concentrating on things, such as reading the newspaper or watching television 0   Moving or speaking so slowly that other people could have noticed. Or the opposite - being so fidgety or restless that you have been moving around a lot more than usual 0   Thoughts that you would be better off dead, or of hurting yourself in some way 0   Total Score 4   If you checked off any problems, how difficult have these problems made it for you to do your work, take care of things at home, or get along with other people? Somewhat difficult       Health Habits and Functional and Cognitive Screening:  Functional & Cognitive Status 3/16/2020   Do you have difficulty preparing food and eating? No   Do you have difficulty bathing yourself, getting dressed or grooming yourself? No   Do you have difficulty using the toilet? No   Do you have difficulty moving around from place to place? Yes   Do you have trouble  with steps or getting out of a bed or a chair? Yes   Current Diet Well Balanced Diet   Dental Exam Not up to date   Eye Exam Not up to date   Exercise (times per week) 0 times per week   Current Exercise Activities Include None   Do you need help using the phone?  No   Are you deaf or do you have serious difficulty hearing?  No   Do you need help with transportation? No   Do you need help shopping? No   Do you need help preparing meals?  No   Do you need help with housework?  No   Do you need help with laundry? No   Do you need help taking your medications? No   Do you need help managing money? No   Do you ever drive or ride in a car without wearing a seat belt? No   Have you felt unusual stress, anger or loneliness in the last month? No   Who do you live with? Spouse   If you need help, do you have trouble finding someone available to you? No   Have you been bothered in the last four weeks by sexual problems? No   Do you have difficulty concentrating, remembering or making decisions? Yes           Does the patient have evidence of cognitive impairment? No    Asiprin use counseling: Taking ASA appropriately as indicated      Recent Lab Results:    Visual Acuity:  No exam data present    Age-appropriate Screening Schedule:  Refer to the list below for future screening recommendations based on patient's age, sex and/or medical conditions. Orders for these recommended tests are listed in the plan section. The patient has been provided with a written plan.    Health Maintenance   Topic Date Due   • ZOSTER VACCINE (2 of 2) 07/26/2017   • LIPID PANEL  03/13/2021   • MAMMOGRAM  03/21/2021   • DXA SCAN  08/09/2022   • COLONOSCOPY  08/30/2026   • TDAP/TD VACCINES (2 - Td) 05/31/2027   • INFLUENZA VACCINE  Addressed        Subjective   History of Present Illness    Eusebia Braden is a 74 y.o. female who presents for an Annual Wellness Visit.    The following portions of the patient's history were reviewed and updated as  appropriate: allergies, current medications, past family history, past medical history, past social history, past surgical history and problem list.    Outpatient Medications Prior to Visit   Medication Sig Dispense Refill   • acetaminophen (TYLENOL) 500 MG tablet Take 1,000 mg by mouth Every 8 (Eight) Hours As Needed for Mild Pain .     • aspirin 325 MG tablet Take 325 mg by mouth 2 (Two) Times a Day.     • loratadine (CLARITIN) 10 MG tablet Take 10 mg by mouth daily.     • atorvastatin (LIPITOR) 20 MG tablet Take 1 tablet by mouth Every Night. 90 tablet 3   • citalopram (CeleXA) 20 MG tablet Take 1 tablet by mouth Daily. 90 tablet 3   • gabapentin (NEURONTIN) 300 MG capsule TAKE 1 CAPSULE EACH MORNING AND AFTERNOON, AND 2 CAPSULES AT BEDTIME 360 capsule 0   • HYDROcodone-acetaminophen (NORCO) 7.5-325 MG per tablet Take 1 tablet by mouth Every 6 (Six) Hours As Needed for Moderate Pain . 30 tablet 0   • LORazepam (ATIVAN) 0.5 MG tablet TAKE 1 TABLET BY MOUTH EVERY 8 (EIGHT) HOURS AS NEEDED FOR ANXIETY. 60 tablet 0   • meloxicam (MOBIC) 15 MG tablet Take 1 tablet by mouth Daily. 90 tablet 3   • vitamin D (ERGOCALCIFEROL) 67104 units capsule capsule Take 50,000 Units by mouth 1 (One) Time Per Week.  11     No facility-administered medications prior to visit.        Patient Active Problem List   Diagnosis   • Peripheral polyneuropathy   • Primary osteoarthritis involving multiple joints   • Mixed hyperlipidemia   • Chronic nonseasonal allergic rhinitis due to pollen   • Generalized anxiety disorder   • Vitamin D deficiency       Advance Care Planning:  has NO advance directive - not interested in additional information   ACP Discussion Status: ACP discussion was held with the patient during this visit. Patient does not have an advance directive, declines further assistance.      Identification of Risk Factors:  Risk factors include: depression.    Review of Systems    Compared to one year ago, the patient feels her  "physical health is the same.  Compared to one year ago, the patient feels her mental health is the same.    Objective     Physical Exam    Vitals:    03/16/20 1135   BP: 118/72   Pulse: 68   Temp: 98.6 °F (37 °C)   TempSrc: Oral   SpO2: 98%   Weight: 73.9 kg (163 lb)   Height: 167.6 cm (66\")   PainSc:   4   PainLoc: Generalized       Patient's Body mass index is 26.31 kg/m². BMI is within normal parameters. No follow-up required..      Assessment/Plan   Patient Self-Management and Personalized Health Advice  The patient has been provided with information about: diet and exercise and preventive services including:   · Diabetes screening, see lab orders, Exercise counseling provided, Fall Risk assessment done, Nutrition counseling provided.    Visit Diagnoses:    ICD-10-CM ICD-9-CM   1. Medicare annual wellness visit, subsequent Z00.00 V70.0   2. Mixed hyperlipidemia E78.2 272.2   3. Peripheral polyneuropathy G62.9 356.9   4. Primary osteoarthritis involving multiple joints M15.0 715.09   5. Vitamin D deficiency E55.9 268.9   6. Generalized anxiety disorder F41.1 300.02   7. Peripheral autonomic neuropathy G90.9 337.9   8. S/P right hip fracture Z87.81 V15.51       Orders Placed This Encounter   Procedures   • Comprehensive Metabolic Panel     Standing Status:   Future     Standing Expiration Date:   3/16/2021   • T4, Free     Standing Status:   Future     Standing Expiration Date:   3/16/2021   • TSH     Standing Status:   Future     Standing Expiration Date:   3/16/2021   • LDL Cholesterol, Direct     Standing Status:   Future     Standing Expiration Date:   3/16/2021   • CBC & Differential     Standing Status:   Future     Standing Expiration Date:   3/16/2021     Order Specific Question:   Manual Differential     Answer:   No       Outpatient Encounter Medications as of 3/16/2020   Medication Sig Dispense Refill   • acetaminophen (TYLENOL) 500 MG tablet Take 1,000 mg by mouth Every 8 (Eight) Hours As Needed for " Mild Pain .     • aspirin 325 MG tablet Take 325 mg by mouth 2 (Two) Times a Day.     • atorvastatin (LIPITOR) 20 MG tablet Take 1 tablet by mouth Every Night. 90 tablet 3   • citalopram (CeleXA) 20 MG tablet Take 1 tablet by mouth Daily. 90 tablet 3   • gabapentin (NEURONTIN) 300 MG capsule 1 cap each morning and afternoon, and 2 caps at bedtime. 360 capsule 1   • HYDROcodone-acetaminophen (NORCO) 7.5-325 MG per tablet Take 1 tablet by mouth Every 6 (Six) Hours As Needed for Moderate Pain . 30 tablet 0   • loratadine (CLARITIN) 10 MG tablet Take 10 mg by mouth daily.     • LORazepam (ATIVAN) 0.5 MG tablet Take 1 tablet by mouth Every 8 (Eight) Hours As Needed for Anxiety. 60 tablet 1   • meloxicam (MOBIC) 15 MG tablet Take 1 tablet by mouth Daily. 90 tablet 3   • vitamin D (ERGOCALCIFEROL) 1.25 MG (51292 UT) capsule capsule Take 1 capsule by mouth 1 (One) Time Per Week. 12 capsule 3   • [DISCONTINUED] atorvastatin (LIPITOR) 20 MG tablet Take 1 tablet by mouth Every Night. 90 tablet 3   • [DISCONTINUED] citalopram (CeleXA) 20 MG tablet Take 1 tablet by mouth Daily. 90 tablet 3   • [DISCONTINUED] gabapentin (NEURONTIN) 300 MG capsule TAKE 1 CAPSULE EACH MORNING AND AFTERNOON, AND 2 CAPSULES AT BEDTIME 360 capsule 0   • [DISCONTINUED] HYDROcodone-acetaminophen (NORCO) 7.5-325 MG per tablet Take 1 tablet by mouth Every 6 (Six) Hours As Needed for Moderate Pain . 30 tablet 0   • [DISCONTINUED] LORazepam (ATIVAN) 0.5 MG tablet TAKE 1 TABLET BY MOUTH EVERY 8 (EIGHT) HOURS AS NEEDED FOR ANXIETY. 60 tablet 0   • [DISCONTINUED] meloxicam (MOBIC) 15 MG tablet Take 1 tablet by mouth Daily. 90 tablet 3   • [DISCONTINUED] vitamin D (ERGOCALCIFEROL) 20526 units capsule capsule Take 50,000 Units by mouth 1 (One) Time Per Week.  11     No facility-administered encounter medications on file as of 3/16/2020.        Reviewed use of high risk medication in the elderly: yes  Reviewed for potential of harmful drug interactions in the  elderly: yes    Follow Up:  Return in about 6 months (around 9/16/2020) for Next scheduled follow up, Or sooner as needed With Labs prior to appointment.     An After Visit Summary and PPPS with all of these plans were given to the patient.

## 2020-03-19 LAB
6-ACETYLMORPHINE: NEGATIVE
6MAM SERPLBLD-MCNC: NOT DETECTED NG/MG CREAT
7-AMINOCLONAZEPAM UR: NOT DETECTED NG/MG CREAT
A-OH ALPRAZ/CREAT UR: NOT DETECTED NG/MG CREAT
ALFENTANIL UR QL: NOT DETECTED NG/MG CREAT
ALPHA-HYDROXYMIDAZOLAM, URINE: NOT DETECTED NG/MG CREAT
ALPHA-HYDROXYTRIAZOLAM, URINE: NOT DETECTED NG/MG CREAT
AMOBARBITAL UR: NOT DETECTED
AMPHET UR QL CFM: NOT DETECTED NG/MG CREAT
AMPHETAMINES UR QL SCN: NEGATIVE
BARBITAL UR QL CFM: NOT DETECTED
BARBITURATES UR QL SCN: NEGATIVE
BENZODIAZ UR QL SCN: NORMAL
BENZOYLECGONINE UR: NOT DETECTED NG/MG CREAT
BUPRENORPHINE UR QL: NEGATIVE
BUPRENORPHINE UR QL: NOT DETECTED NG/MG CREAT
BUTABARBITAL UR QL: NOT DETECTED
BUTALBITAL UR QL: NOT DETECTED
CANNABINOIDS UR QL CFM: NEGATIVE
CLONAZEPAM UR QL: NOT DETECTED NG/MG CREAT
COCAETHYLENE UR QL CFM: NOT DETECTED NG/MG CREAT
COCAINE UR QL CFM: NEGATIVE
CODEINE UR QL: NOT DETECTED NG/MG CREAT
CONV COCAINE, UR: NOT DETECTED NG/MG CREAT
CONV REPORT SUMMARY: NORMAL
CREAT 24H UR-MCNC: 170 MG/DL
DESALKYLFLURAZ/CREAT UR: NOT DETECTED NG/MG CREAT
DESMETHYLFLUNITRAZEPAM: NOT DETECTED NG/MG CREAT
DIAZEPAM UR-MCNC: NOT DETECTED NG/MG CREAT
DIHYDROCODEINE UR: NOT DETECTED NG/MG CREAT
EDDP SERPL QL: NOT DETECTED NG/MG CREAT
ETHANOL SCREEN URINE (REF): NEGATIVE
ETHANOL UR-MCNC: NOT DETECTED G/DL
FENTANYL UR QL: NEGATIVE
FENTANYL+NORFENTANYL UR QL SCN: NOT DETECTED NG/MG CREAT
FLUNITRAZEPAM SERPLBLD-MCNC: NOT DETECTED NG/MG CREAT
HYDROCODONE UR QL: NOT DETECTED NG/MG CREAT
HYDROMORPHONE UR QL: NOT DETECTED NG/MG CREAT
LEVEL OF DETECTION:: NORMAL
LORAZEPAM UR-MCNC: 335 NG/MG CREAT
LORAZEPAM/CREAT UR: NOT DETECTED NG/MG CREAT
MDA SERPLBLD-MCNC: NOT DETECTED NG/MG CREAT
MDMA UR QL SCN: NOT DETECTED NG/MG CREAT
MEPHOBARBITAL UR QL CFM: NOT DETECTED
METHADONE BLD QL SCN: NEGATIVE
METHADONE, URINE: NOT DETECTED NG/MG CREAT
METHAMPHETAMINE UR: NOT DETECTED NG/MG CREAT
MIDAZOLAM UR-MCNC: NOT DETECTED NG/MG CREAT
MORPHINE UR QL: NOT DETECTED NG/MG CREAT
N-DESMETHYLTRAMADOL, U: NOT DETECTED NG/MG CREAT
NARCOTICS UR: NEGATIVE
NORBUPRENORPHINE SERPLBLD-MCNC: NOT DETECTED NG/MG CREAT
NORCODEINE UR-MCNC: NOT DETECTED NG/MG CREAT
NORDIAZEPAM SERPL CFM-MCNC: NOT DETECTED NG/MG CREAT
NORFENTANYL UR: NOT DETECTED NG/MG CREAT
NORMORPHINE: NOT DETECTED NG/MG CREAT
NOROXYMORPHONE: NOT DETECTED NG/MG CREAT
O-DESMETHYLTRAMADOL, UR: NOT DETECTED NG/MG CREAT
OPIATES UR QL CFM: NEGATIVE
OPIATES, URINE, NORHYDROCODONE: NOT DETECTED NG/MG CREAT
OPIATES, URINE, NOROXYCODONE: NOT DETECTED NG/MG CREAT
OXAZEPAM UR QL: NOT DETECTED NG/MG CREAT
OXYCODONE UR QL: NEGATIVE
OXYCODONE UR: NOT DETECTED NG/MG CREAT
OXYMORPHONE UR: NOT DETECTED NG/MG CREAT
PENTOBARBITAL UR: NOT DETECTED
PHENOBARB UR QL: NOT DETECTED
SECOBARBITAL UR QL: NOT DETECTED
SUFENTANIL UR QL: NOT DETECTED NG/MG CREAT
TAPENTADOL SERPLBLD-MCNC: NEGATIVE NG/ML
TAPENTADOL UR-MCNC: NOT DETECTED NG/MG CREAT
TEMAZEPAM UR QL CFM: NOT DETECTED NG/MG CREAT
THC UR CFM-MCNC: NOT DETECTED NG/MG CREAT
THIOPENTAL UR QL CFM: NOT DETECTED
TRAMADOL UR: NOT DETECTED NG/MG CREAT

## 2020-07-28 DIAGNOSIS — F41.1 GENERALIZED ANXIETY DISORDER: Chronic | ICD-10-CM

## 2020-07-28 RX ORDER — LORAZEPAM 0.5 MG/1
0.5 TABLET ORAL EVERY 8 HOURS PRN
Qty: 60 TABLET | Refills: 1 | Status: SHIPPED | OUTPATIENT
Start: 2020-07-28 | End: 2020-12-01

## 2020-09-11 ENCOUNTER — LAB (OUTPATIENT)
Dept: LAB | Facility: OTHER | Age: 75
End: 2020-09-11

## 2020-09-11 DIAGNOSIS — E78.2 MIXED HYPERLIPIDEMIA: ICD-10-CM

## 2020-09-11 LAB
ALBUMIN SERPL-MCNC: 4 G/DL (ref 3.5–5)
ALBUMIN/GLOB SERPL: 1.2 G/DL (ref 1.1–1.8)
ALP SERPL-CCNC: 82 U/L (ref 38–126)
ALT SERPL W P-5'-P-CCNC: 17 U/L
ANION GAP SERPL CALCULATED.3IONS-SCNC: 6 MMOL/L (ref 5–15)
ARTICHOKE IGE QN: 71 MG/DL (ref 0–100)
AST SERPL-CCNC: 27 U/L (ref 14–36)
BASOPHILS # BLD AUTO: 0.06 10*3/MM3 (ref 0–0.2)
BASOPHILS NFR BLD AUTO: 0.8 % (ref 0–1.5)
BILIRUB SERPL-MCNC: 1 MG/DL (ref 0.2–1.3)
BUN SERPL-MCNC: 18 MG/DL (ref 7–23)
BUN/CREAT SERPL: 23.1 (ref 7–25)
CALCIUM SPEC-SCNC: 9.4 MG/DL (ref 8.4–10.2)
CHLORIDE SERPL-SCNC: 103 MMOL/L (ref 101–112)
CO2 SERPL-SCNC: 29 MMOL/L (ref 22–30)
CREAT SERPL-MCNC: 0.78 MG/DL (ref 0.52–1.04)
DEPRECATED RDW RBC AUTO: 40.3 FL (ref 37–54)
EOSINOPHIL # BLD AUTO: 0.21 10*3/MM3 (ref 0–0.4)
EOSINOPHIL NFR BLD AUTO: 2.9 % (ref 0.3–6.2)
ERYTHROCYTE [DISTWIDTH] IN BLOOD BY AUTOMATED COUNT: 13.1 % (ref 12.3–15.4)
GFR SERPL CREATININE-BSD FRML MDRD: 72 ML/MIN/1.73 (ref 39–90)
GLOBULIN UR ELPH-MCNC: 3.3 GM/DL (ref 2.3–3.5)
GLUCOSE SERPL-MCNC: 102 MG/DL (ref 70–99)
HCT VFR BLD AUTO: 39.3 % (ref 34–46.6)
HGB BLD-MCNC: 12.6 G/DL (ref 12–15.9)
LYMPHOCYTES # BLD AUTO: 1.82 10*3/MM3 (ref 0.7–3.1)
LYMPHOCYTES NFR BLD AUTO: 25 % (ref 19.6–45.3)
MCH RBC QN AUTO: 27.5 PG (ref 26.6–33)
MCHC RBC AUTO-ENTMCNC: 32.1 G/DL (ref 31.5–35.7)
MCV RBC AUTO: 85.8 FL (ref 79–97)
MONOCYTES # BLD AUTO: 0.69 10*3/MM3 (ref 0.1–0.9)
MONOCYTES NFR BLD AUTO: 9.5 % (ref 5–12)
NEUTROPHILS NFR BLD AUTO: 4.5 10*3/MM3 (ref 1.7–7)
NEUTROPHILS NFR BLD AUTO: 61.8 % (ref 42.7–76)
PLATELET # BLD AUTO: 203 10*3/MM3 (ref 140–450)
PMV BLD AUTO: 10.9 FL (ref 6–12)
POTASSIUM SERPL-SCNC: 4.1 MMOL/L (ref 3.4–5)
PROT SERPL-MCNC: 7.3 G/DL (ref 6.3–8.6)
RBC # BLD AUTO: 4.58 10*6/MM3 (ref 3.77–5.28)
SODIUM SERPL-SCNC: 138 MMOL/L (ref 137–145)
T4 FREE SERPL-MCNC: 1.05 NG/DL (ref 0.93–1.7)
TSH SERPL DL<=0.05 MIU/L-ACNC: 1.85 UIU/ML (ref 0.27–4.2)
WBC # BLD AUTO: 7.28 10*3/MM3 (ref 3.4–10.8)

## 2020-09-11 PROCEDURE — 84439 ASSAY OF FREE THYROXINE: CPT | Performed by: INTERNAL MEDICINE

## 2020-09-11 PROCEDURE — 83721 ASSAY OF BLOOD LIPOPROTEIN: CPT | Performed by: INTERNAL MEDICINE

## 2020-09-11 PROCEDURE — 85025 COMPLETE CBC W/AUTO DIFF WBC: CPT | Performed by: INTERNAL MEDICINE

## 2020-09-11 PROCEDURE — 36415 COLL VENOUS BLD VENIPUNCTURE: CPT | Performed by: INTERNAL MEDICINE

## 2020-09-11 PROCEDURE — 80053 COMPREHEN METABOLIC PANEL: CPT | Performed by: INTERNAL MEDICINE

## 2020-09-11 PROCEDURE — 84443 ASSAY THYROID STIM HORMONE: CPT | Performed by: INTERNAL MEDICINE

## 2020-09-17 ENCOUNTER — OFFICE VISIT (OUTPATIENT)
Dept: FAMILY MEDICINE CLINIC | Facility: CLINIC | Age: 75
End: 2020-09-17

## 2020-09-17 VITALS
DIASTOLIC BLOOD PRESSURE: 80 MMHG | OXYGEN SATURATION: 99 % | BODY MASS INDEX: 28.93 KG/M2 | WEIGHT: 180 LBS | TEMPERATURE: 98.6 F | HEART RATE: 70 BPM | HEIGHT: 66 IN | SYSTOLIC BLOOD PRESSURE: 130 MMHG

## 2020-09-17 DIAGNOSIS — G62.9 PERIPHERAL POLYNEUROPATHY: Chronic | ICD-10-CM

## 2020-09-17 DIAGNOSIS — E78.2 MIXED HYPERLIPIDEMIA: Primary | Chronic | ICD-10-CM

## 2020-09-17 DIAGNOSIS — E55.9 VITAMIN D DEFICIENCY: Chronic | ICD-10-CM

## 2020-09-17 DIAGNOSIS — G90.9 PERIPHERAL AUTONOMIC NEUROPATHY: Chronic | ICD-10-CM

## 2020-09-17 DIAGNOSIS — F41.1 GENERALIZED ANXIETY DISORDER: Chronic | ICD-10-CM

## 2020-09-17 DIAGNOSIS — M15.9 PRIMARY OSTEOARTHRITIS INVOLVING MULTIPLE JOINTS: Chronic | ICD-10-CM

## 2020-09-17 PROCEDURE — 99214 OFFICE O/P EST MOD 30 MIN: CPT | Performed by: INTERNAL MEDICINE

## 2020-09-17 RX ORDER — GABAPENTIN 300 MG/1
CAPSULE ORAL
Qty: 360 CAPSULE | Refills: 1 | Status: SHIPPED | OUTPATIENT
Start: 2020-09-17 | End: 2021-03-16 | Stop reason: SDUPTHER

## 2020-09-17 NOTE — PROGRESS NOTES
Chief Complaint   Patient presents with   • Hyperlipidemia     6 month f/u on labs      Subjective   Eusebia Braden is a 75 y.o. female who presents to the office for follow-up and review of labs. She has hyperlipidemia and takes Lipitor daily.  She has osteoarthritis which causes low back pain. She takes meloxicam daily which keeps her arthritic pain at a tolerable level.  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  The numbness and tingling radiates into the lower extremities bilaterally. This has been baseline and controlled.  She takes Norco 7.5/325 mg very sparingly as needed for flareups in her arthritic pain.  A single prescription will usually last her for several months.  She continues to have increased pain and stiffness in her knees.  We have discussed orthopedic referral in the past, but she is not yet ready to proceed with this.  She takes Celexa daily and lorazepam as needed secondary to grief reaction and anxiety.  She has vitamin D deficiency, and takes a weekly dose of ergocalciferol.    The following portions of the patient's history were reviewed and updated as appropriate: allergies, current medications, past family history, past medical history, past social history, past surgical history and problem list.    Review of Systems   Constitutional: Negative for chills, fatigue and fever.   HENT: Negative for congestion, sneezing, sore throat and trouble swallowing.    Eyes: Negative for visual disturbance.   Respiratory: Negative for cough, chest tightness, shortness of breath and wheezing.    Cardiovascular: Negative for chest pain, palpitations and leg swelling.   Gastrointestinal: Negative for abdominal pain, constipation, diarrhea, nausea and vomiting.   Genitourinary: Negative for dysuria, frequency and urgency.   Musculoskeletal: Positive for arthralgias and back pain. Negative for neck pain.   Skin: Negative for rash.   Neurological:  "Negative for dizziness, weakness and headaches.   Psychiatric/Behavioral:        Patient denies any feelings of depression and has not felt down, hopeless or lost interest in any activities.   All other systems reviewed and are negative.      Objective   Vitals:    09/17/20 1037   BP: 130/80   Pulse: 70   Temp: 98.6 °F (37 °C)   TempSrc: Oral   SpO2: 99%   Weight: 81.6 kg (180 lb)   Height: 167.6 cm (66\")      Body mass index is 29.05 kg/m².    Physical Exam   Constitutional: She is oriented to person, place, and time. She appears well-developed.   HENT:   Head: Normocephalic and atraumatic.   Nose: Nose normal.   Mouth/Throat: No oropharyngeal exudate.   Eyes: Pupils are equal, round, and reactive to light. Conjunctivae are normal. Right eye exhibits no discharge. Left eye exhibits no discharge. No scleral icterus.   Neck: Normal range of motion. Neck supple. No thyromegaly present.   Cardiovascular: Normal rate, regular rhythm and normal heart sounds. Exam reveals no gallop and no friction rub.   No murmur heard.  Pulmonary/Chest: Effort normal and breath sounds normal. No respiratory distress. She has no wheezes. She has no rales.   Abdominal: Soft. Bowel sounds are normal. She exhibits no distension. There is no abdominal tenderness. There is no rebound and no guarding.   Musculoskeletal:      Lumbar back: She exhibits pain.   Lymphadenopathy:     She has no cervical adenopathy.   Neurological: She is alert and oriented to person, place, and time. No cranial nerve deficit.   Skin: Skin is warm and dry. No rash noted.   Psychiatric: Her behavior is normal. Judgment and thought content normal.   Nursing note and vitals reviewed.      Assessment/Plan   Eusebia was seen today for hyperlipidemia.    Diagnoses and all orders for this visit:    Mixed hyperlipidemia  -     CBC & Differential; Future  -     Comprehensive Metabolic Panel; Future  -     T4, Free; Future  -     TSH; Future  -     Urinalysis With Culture If " Indicated -; Future  -     Lipid Panel; Future    Peripheral polyneuropathy    Primary osteoarthritis involving multiple joints    Generalized anxiety disorder    Vitamin D deficiency  -     Vitamin D 25 Hydroxy; Future    Peripheral autonomic neuropathy  -     gabapentin (NEURONTIN) 300 MG capsule; 1 cap each morning and afternoon, and 2 caps at bedtime.         Labs are reviewed with patient.  Overall her labs look good.  Her glucose is slightly elevated at 102.  She will monitor her dietary intake of sugar.  Her LDL is at goal at 71.  She will continue with Lipitor for hyperlipidemia.  She will continue with meloxicam for treatment of the osteoarthritis.  She will continue with Norco 7.5/325 mg every 6 hours as needed for the arthritic/knee pain.  She will use this sparingly.  She will continue with gabapentin for treatment of the neuropathic pain.  She will continue with citalopram daily and lorazepam as needed for anxiety and depression.  She will continue with the weekly dose of ergocalciferol for treatment of her vitamin D deficiency.    Patient understands the risks associated with this controlled medication, including tolerance and addiction.  Patient also agrees to only obtain this medication from me, and not from a another provider, unless that provider is covering for me in my absence.  Patient also agrees to be compliant in dosing, and not self adjust the dose of medication.  A signed controlled substance agreement is on file, and the patient has received a controlled substance education sheet at this a previous visit.  The patient has also signed a consent for treatment with a controlled substance as per [facility] policy. KESHAWN was obtained.    Patient does not want a flu shot today.    CONTROLLED SUBSTANCE TRACKING 8/2/2019 9/16/2019 3/16/2020 9/17/2020   Joe Devi 8/2/2019 9/16/2019 3/16/2020 9/17/2020   Report Number [id number]   Last Controlled Substance Agreement  8/2/2019 9/16/2019 3/16/2020 9/17/2020         PHQ-2/PHQ-9 Depression Screening 9/17/2020   Little interest or pleasure in doing things 2   Feeling down, depressed, or hopeless 1   Trouble falling or staying asleep, or sleeping too much -   Feeling tired or having little energy 2   Poor appetite or overeating 0   Feeling bad about yourself - or that you are a failure or have let yourself or your family down 0   Trouble concentrating on things, such as reading the newspaper or watching television 0   Moving or speaking so slowly that other people could have noticed. Or the opposite - being so fidgety or restless that you have been moving around a lot more than usual 0   Thoughts that you would be better off dead, or of hurting yourself in some way 0   Total Score 5   If you checked off any problems, how difficult have these problems made it for you to do your work, take care of things at home, or get along with other people? -         Lab on 09/11/2020   Component Date Value Ref Range Status   • Glucose 09/11/2020 102* 70 - 99 mg/dL Final   • BUN 09/11/2020 18  7 - 23 mg/dL Final   • Creatinine 09/11/2020 0.78  0.52 - 1.04 mg/dL Final   • Sodium 09/11/2020 138  137 - 145 mmol/L Final   • Potassium 09/11/2020 4.1  3.4 - 5.0 mmol/L Final   • Chloride 09/11/2020 103  101 - 112 mmol/L Final   • CO2 09/11/2020 29.0  22.0 - 30.0 mmol/L Final   • Calcium 09/11/2020 9.4  8.4 - 10.2 mg/dL Final   • Total Protein 09/11/2020 7.3  6.3 - 8.6 g/dL Final   • Albumin 09/11/2020 4.00  3.50 - 5.00 g/dL Final   • ALT (SGPT) 09/11/2020 17  <=35 U/L Final   • AST (SGOT) 09/11/2020 27  14 - 36 U/L Final   • Alkaline Phosphatase 09/11/2020 82  38 - 126 U/L Final   • Total Bilirubin 09/11/2020 1.0  0.2 - 1.3 mg/dL Final   • eGFR Non African Amer 09/11/2020 72  39 - 90 mL/min/1.73 Final   • Globulin 09/11/2020 3.3  2.3 - 3.5 gm/dL Final   • A/G Ratio 09/11/2020 1.2  1.1 - 1.8 g/dL Final   • BUN/Creatinine Ratio 09/11/2020 23.1  7.0 - 25.0  Final   • Anion Gap 09/11/2020 6.0  5.0 - 15.0 mmol/L Final   • Free T4 09/11/2020 1.05  0.93 - 1.70 ng/dL Final   • TSH 09/11/2020 1.850  0.270 - 4.200 uIU/mL Final   • LDL Cholesterol  09/11/2020 71  0 - 100 mg/dL Final   • WBC 09/11/2020 7.28  3.40 - 10.80 10*3/mm3 Final   • RBC 09/11/2020 4.58  3.77 - 5.28 10*6/mm3 Final   • Hemoglobin 09/11/2020 12.6  12.0 - 15.9 g/dL Final   • Hematocrit 09/11/2020 39.3  34.0 - 46.6 % Final   • MCV 09/11/2020 85.8  79.0 - 97.0 fL Final   • MCH 09/11/2020 27.5  26.6 - 33.0 pg Final   • MCHC 09/11/2020 32.1  31.5 - 35.7 g/dL Final   • RDW 09/11/2020 13.1  12.3 - 15.4 % Final   • RDW-SD 09/11/2020 40.3  37.0 - 54.0 fl Final   • MPV 09/11/2020 10.9  6.0 - 12.0 fL Final   • Platelets 09/11/2020 203  140 - 450 10*3/mm3 Final   • Neutrophil % 09/11/2020 61.8  42.7 - 76.0 % Final   • Lymphocyte % 09/11/2020 25.0  19.6 - 45.3 % Final   • Monocyte % 09/11/2020 9.5  5.0 - 12.0 % Final   • Eosinophil % 09/11/2020 2.9  0.3 - 6.2 % Final   • Basophil % 09/11/2020 0.8  0.0 - 1.5 % Final   • Neutrophils, Absolute 09/11/2020 4.50  1.70 - 7.00 10*3/mm3 Final   • Lymphocytes, Absolute 09/11/2020 1.82  0.70 - 3.10 10*3/mm3 Final   • Monocytes, Absolute 09/11/2020 0.69  0.10 - 0.90 10*3/mm3 Final   • Eosinophils, Absolute 09/11/2020 0.21  0.00 - 0.40 10*3/mm3 Final   • Basophils, Absolute 09/11/2020 0.06  0.00 - 0.20 10*3/mm3 Final   ].  .  .

## 2020-11-30 DIAGNOSIS — F41.1 GENERALIZED ANXIETY DISORDER: Chronic | ICD-10-CM

## 2020-12-01 RX ORDER — LORAZEPAM 0.5 MG/1
0.5 TABLET ORAL EVERY 8 HOURS PRN
Qty: 60 TABLET | Refills: 1 | Status: SHIPPED | OUTPATIENT
Start: 2020-12-01 | End: 2021-04-07 | Stop reason: SDUPTHER

## 2021-03-02 DIAGNOSIS — F41.1 GENERALIZED ANXIETY DISORDER: Chronic | ICD-10-CM

## 2021-03-02 RX ORDER — ERGOCALCIFEROL 1.25 MG/1
50000 CAPSULE ORAL WEEKLY
Qty: 12 CAPSULE | Refills: 3 | Status: SHIPPED | OUTPATIENT
Start: 2021-03-02 | End: 2022-03-01

## 2021-03-02 RX ORDER — CITALOPRAM 20 MG/1
20 TABLET ORAL DAILY
Qty: 90 TABLET | Refills: 3 | Status: SHIPPED | OUTPATIENT
Start: 2021-03-02 | End: 2022-03-29 | Stop reason: SDUPTHER

## 2021-03-12 ENCOUNTER — LAB (OUTPATIENT)
Dept: LAB | Facility: OTHER | Age: 76
End: 2021-03-12

## 2021-03-12 DIAGNOSIS — E78.2 MIXED HYPERLIPIDEMIA: ICD-10-CM

## 2021-03-12 DIAGNOSIS — E55.9 VITAMIN D DEFICIENCY: Chronic | ICD-10-CM

## 2021-03-12 LAB
ALBUMIN SERPL-MCNC: 4 G/DL (ref 3.5–5)
ALBUMIN/GLOB SERPL: 1.4 G/DL (ref 1.1–1.8)
ALP SERPL-CCNC: 82 U/L (ref 38–126)
ALT SERPL W P-5'-P-CCNC: 14 U/L
ANION GAP SERPL CALCULATED.3IONS-SCNC: 5 MMOL/L (ref 5–15)
AST SERPL-CCNC: 25 U/L (ref 14–36)
BACTERIA UR QL AUTO: ABNORMAL /HPF
BILIRUB SERPL-MCNC: 0.8 MG/DL (ref 0.2–1.3)
BILIRUB UR QL STRIP: NEGATIVE
BUN SERPL-MCNC: 20 MG/DL (ref 7–23)
BUN/CREAT SERPL: 26 (ref 7–25)
CALCIUM SPEC-SCNC: 9.2 MG/DL (ref 8.4–10.2)
CHLORIDE SERPL-SCNC: 106 MMOL/L (ref 101–112)
CHOLEST SERPL-MCNC: 172 MG/DL (ref 150–200)
CLARITY UR: ABNORMAL
CO2 SERPL-SCNC: 28 MMOL/L (ref 22–30)
COLOR UR: YELLOW
CREAT SERPL-MCNC: 0.77 MG/DL (ref 0.52–1.04)
DEPRECATED RDW RBC AUTO: 40.7 FL (ref 37–54)
EOSINOPHIL # BLD MANUAL: 0.27 10*3/MM3 (ref 0–0.4)
EOSINOPHIL NFR BLD MANUAL: 4 % (ref 0.3–6.2)
ERYTHROCYTE [DISTWIDTH] IN BLOOD BY AUTOMATED COUNT: 13 % (ref 12.3–15.4)
GFR SERPL CREATININE-BSD FRML MDRD: 73 ML/MIN/1.73 (ref 39–90)
GLOBULIN UR ELPH-MCNC: 2.9 GM/DL (ref 2.3–3.5)
GLUCOSE SERPL-MCNC: 98 MG/DL (ref 70–99)
GLUCOSE UR STRIP-MCNC: NEGATIVE MG/DL
HCT VFR BLD AUTO: 39.9 % (ref 34–46.6)
HDLC SERPL-MCNC: 56 MG/DL (ref 40–59)
HGB BLD-MCNC: 12.8 G/DL (ref 12–15.9)
HGB UR QL STRIP.AUTO: ABNORMAL
HYALINE CASTS UR QL AUTO: ABNORMAL /LPF
KETONES UR QL STRIP: NEGATIVE
LDLC SERPL CALC-MCNC: 101 MG/DL
LDLC/HDLC SERPL: 1.78 {RATIO} (ref 0–3.22)
LEUKOCYTE ESTERASE UR QL STRIP.AUTO: ABNORMAL
LYMPHOCYTES # BLD MANUAL: 1.82 10*3/MM3 (ref 0.7–3.1)
LYMPHOCYTES NFR BLD MANUAL: 27 % (ref 19.6–45.3)
LYMPHOCYTES NFR BLD MANUAL: 9 % (ref 5–12)
MCH RBC QN AUTO: 27.8 PG (ref 26.6–33)
MCHC RBC AUTO-ENTMCNC: 32.1 G/DL (ref 31.5–35.7)
MCV RBC AUTO: 86.7 FL (ref 79–97)
MONOCYTES # BLD AUTO: 0.61 10*3/MM3 (ref 0.1–0.9)
NEUTROPHILS # BLD AUTO: 4.04 10*3/MM3 (ref 1.7–7)
NEUTROPHILS NFR BLD MANUAL: 59 % (ref 42.7–76)
NEUTS BAND NFR BLD MANUAL: 1 % (ref 0–5)
NITRITE UR QL STRIP: NEGATIVE
PH UR STRIP.AUTO: 5.5 [PH] (ref 5.5–8)
PLATELET # BLD AUTO: 201 10*3/MM3 (ref 140–450)
PMV BLD AUTO: 11.6 FL (ref 6–12)
POTASSIUM SERPL-SCNC: 4.4 MMOL/L (ref 3.4–5)
PROT SERPL-MCNC: 6.9 G/DL (ref 6.3–8.6)
PROT UR QL STRIP: NEGATIVE
RBC # BLD AUTO: 4.6 10*6/MM3 (ref 3.77–5.28)
RBC # UR: ABNORMAL /HPF
RBC MORPH BLD: NORMAL
REF LAB TEST METHOD: ABNORMAL
SMALL PLATELETS BLD QL SMEAR: ADEQUATE
SODIUM SERPL-SCNC: 139 MMOL/L (ref 137–145)
SP GR UR STRIP: >=1.03 (ref 1–1.03)
SQUAMOUS #/AREA URNS HPF: ABNORMAL /HPF
TRIGL SERPL-MCNC: 82 MG/DL
UROBILINOGEN UR QL STRIP: ABNORMAL
VLDLC SERPL-MCNC: 15 MG/DL (ref 5–40)
WBC # BLD AUTO: 6.74 10*3/MM3 (ref 3.4–10.8)
WBC MORPH BLD: NORMAL
WBC UR QL AUTO: ABNORMAL /HPF

## 2021-03-12 PROCEDURE — 84443 ASSAY THYROID STIM HORMONE: CPT | Performed by: INTERNAL MEDICINE

## 2021-03-12 PROCEDURE — 81001 URINALYSIS AUTO W/SCOPE: CPT | Performed by: INTERNAL MEDICINE

## 2021-03-12 PROCEDURE — 36415 COLL VENOUS BLD VENIPUNCTURE: CPT | Performed by: INTERNAL MEDICINE

## 2021-03-12 PROCEDURE — 80053 COMPREHEN METABOLIC PANEL: CPT | Performed by: INTERNAL MEDICINE

## 2021-03-12 PROCEDURE — 84439 ASSAY OF FREE THYROXINE: CPT | Performed by: INTERNAL MEDICINE

## 2021-03-12 PROCEDURE — 80061 LIPID PANEL: CPT | Performed by: INTERNAL MEDICINE

## 2021-03-12 PROCEDURE — 82306 VITAMIN D 25 HYDROXY: CPT | Performed by: INTERNAL MEDICINE

## 2021-03-12 PROCEDURE — 87086 URINE CULTURE/COLONY COUNT: CPT | Performed by: INTERNAL MEDICINE

## 2021-03-12 PROCEDURE — 85025 COMPLETE CBC W/AUTO DIFF WBC: CPT | Performed by: INTERNAL MEDICINE

## 2021-03-13 LAB
25(OH)D3 SERPL-MCNC: 64.7 NG/ML (ref 30–100)
BACTERIA SPEC AEROBE CULT: NO GROWTH
T4 FREE SERPL-MCNC: 0.99 NG/DL (ref 0.93–1.7)
TSH SERPL DL<=0.05 MIU/L-ACNC: 1.9 UIU/ML (ref 0.27–4.2)

## 2021-03-16 ENCOUNTER — OFFICE VISIT (OUTPATIENT)
Dept: FAMILY MEDICINE CLINIC | Facility: CLINIC | Age: 76
End: 2021-03-16

## 2021-03-16 VITALS
DIASTOLIC BLOOD PRESSURE: 78 MMHG | WEIGHT: 187 LBS | BODY MASS INDEX: 30.05 KG/M2 | HEIGHT: 66 IN | OXYGEN SATURATION: 99 % | TEMPERATURE: 97.4 F | SYSTOLIC BLOOD PRESSURE: 118 MMHG | HEART RATE: 72 BPM

## 2021-03-16 DIAGNOSIS — F41.1 GENERALIZED ANXIETY DISORDER: Chronic | ICD-10-CM

## 2021-03-16 DIAGNOSIS — Z00.00 MEDICARE ANNUAL WELLNESS VISIT, SUBSEQUENT: Primary | ICD-10-CM

## 2021-03-16 DIAGNOSIS — G62.9 PERIPHERAL POLYNEUROPATHY: Chronic | ICD-10-CM

## 2021-03-16 DIAGNOSIS — Z79.899 DRUG THERAPY: ICD-10-CM

## 2021-03-16 DIAGNOSIS — E55.9 VITAMIN D DEFICIENCY: Chronic | ICD-10-CM

## 2021-03-16 DIAGNOSIS — E78.2 MIXED HYPERLIPIDEMIA: Chronic | ICD-10-CM

## 2021-03-16 DIAGNOSIS — M15.9 PRIMARY OSTEOARTHRITIS INVOLVING MULTIPLE JOINTS: Chronic | ICD-10-CM

## 2021-03-16 PROCEDURE — G0439 PPPS, SUBSEQ VISIT: HCPCS | Performed by: INTERNAL MEDICINE

## 2021-03-16 RX ORDER — GABAPENTIN 300 MG/1
CAPSULE ORAL
Qty: 360 CAPSULE | Refills: 1 | Status: SHIPPED | OUTPATIENT
Start: 2021-03-16 | End: 2021-09-14 | Stop reason: SDUPTHER

## 2021-03-16 RX ORDER — ATORVASTATIN CALCIUM 20 MG/1
20 TABLET, FILM COATED ORAL NIGHTLY
Qty: 90 TABLET | Refills: 3 | Status: SHIPPED | OUTPATIENT
Start: 2021-03-16 | End: 2022-06-30 | Stop reason: SDUPTHER

## 2021-03-16 RX ORDER — MELOXICAM 15 MG/1
15 TABLET ORAL DAILY
Qty: 90 TABLET | Refills: 3 | Status: SHIPPED | OUTPATIENT
Start: 2021-03-16 | End: 2022-03-29 | Stop reason: SDUPTHER

## 2021-03-16 NOTE — PROGRESS NOTES
Chief Complaint   Patient presents with   • Medicare Wellness-subsequent     sub medicare wellness    • Hyperlipidemia     6 month f/u on labs      Subjective   Eusebia Braden is a 75 y.o. female who presents to the office for follow-up and review of labs.  She has hyperlipidemia and takes Lipitor 20 mg daily.  She has osteoarthritis which causes chronic low back and joint pain.  She takes meloxicam daily and this is effective for her arthritic pain.  She has neuropathic pain secondary to the osteoarthritis in her low back.  She takes gabapentin 3 times daily.  This keeps her neuropathic pain at a tolerable level. She has anxiety and takes Celexa daily.  She also takes lorazepam 0.5 mg up to 3 times daily as needed for increased anxiety.  She typically only has to take this once daily.  She has vitamin D deficiency and takes a weekly dose of ergocalciferol.    The following portions of the patient's history were reviewed and updated as appropriate: allergies, current medications, past family history, past medical history, past social history, past surgical history and problem list.    Review of Systems   Constitutional: Negative for chills, fatigue and fever.   HENT: Negative for congestion, sneezing, sore throat and trouble swallowing.    Eyes: Negative for visual disturbance.   Respiratory: Negative for cough, chest tightness, shortness of breath and wheezing.    Cardiovascular: Negative for chest pain, palpitations and leg swelling.   Gastrointestinal: Negative for abdominal pain, constipation, diarrhea, nausea and vomiting.   Genitourinary: Negative for dysuria, frequency and urgency.   Musculoskeletal: Positive for arthralgias and back pain. Negative for neck pain.   Skin: Negative for rash.   Neurological: Negative for dizziness, weakness and headaches.   Psychiatric/Behavioral:        Patient denies any feelings of depression and has not felt down, hopeless or lost interest in any activities.   All other  "systems reviewed and are negative.  Review of systems has been reviewed and validated on 03/16/2021and updated with any changes.      Objective   Vitals:    03/16/21 1046   BP: 118/78   Pulse: 72   Temp: 97.4 °F (36.3 °C)   TempSrc: Oral   SpO2: 99%   Weight: 84.8 kg (187 lb)   Height: 167.6 cm (66\")   PainSc: 0-No pain   PainLoc: Generalized      Body mass index is 30.18 kg/m².    Physical Exam   Constitutional: She is oriented to person, place, and time. She appears well-developed.   HENT:   Head: Normocephalic and atraumatic.   Eyes: Pupils are equal, round, and reactive to light. Conjunctivae are normal. Right eye exhibits no discharge. Left eye exhibits no discharge. No scleral icterus.   Cardiovascular: Normal rate, regular rhythm and normal heart sounds. Exam reveals no gallop and no friction rub.   No murmur heard.  Pulmonary/Chest: Effort normal and breath sounds normal. No respiratory distress. She has no wheezes. She has no rales.   Musculoskeletal:      Lumbar back: She exhibits pain.   Neurological: She is alert and oriented to person, place, and time. No cranial nerve deficit.   Skin: Skin is warm and dry. No rash noted.   Psychiatric: Her behavior is normal. Judgment and thought content normal.   Nursing note and vitals reviewed.  Physical exam has been reviewed and validated on 03/16/2021and updated with any changes.    Assessment/Plan   Diagnoses and all orders for this visit:    1. Medicare annual wellness visit, subsequent (Primary)    2. Mixed hyperlipidemia  -     CBC & Differential; Future  -     Comprehensive Metabolic Panel; Future  -     T4, Free; Future  -     TSH; Future  -     Urinalysis With Culture If Indicated -; Future  -     LDL Cholesterol, Direct; Future  -     atorvastatin (LIPITOR) 20 MG tablet; Take 1 tablet by mouth Every Night.  Dispense: 90 tablet; Refill: 3    3. Vitamin D deficiency    4. Primary osteoarthritis involving multiple joints  -     meloxicam (MOBIC) 15 MG " tablet; Take 1 tablet by mouth Daily.  Dispense: 90 tablet; Refill: 3    5. Peripheral polyneuropathy  -     gabapentin (NEURONTIN) 300 MG capsule; 1 cap each morning and afternoon, and 2 caps at bedtime.  Dispense: 360 capsule; Refill: 1    6. Generalized anxiety disorder    7. Drug therapy  -     ToxASSURE Select 13 Discrete -; Future         Labs are reviewed with patient.  Overall her labs look good.  Her total cholesterol is 172,  and triglycerides 82.  She will continue with Lipitor for treatment of hyperlipidemia.  She will continue with meloxicam for treatment of osteoarthritis. She will continue with gabapentin 3 times daily for treatment of the neuropathic pain. She will continue with citalopram daily and lorazepam as needed for anxiety and depression.  Her vitamin D level is normal at 64.7.  She will continue with the weekly dose of ergocalciferol for treatment of her vitamin D deficiency.    Patient understands the risks associated with this controlled medication, including tolerance and addiction.  Patient also agrees to only obtain this medication from me, and not from a another provider, unless that provider is covering for me in my absence.  Patient also agrees to be compliant in dosing, and not self adjust the dose of medication.  A signed controlled substance agreement is on file, and the patient has received a controlled substance education sheet at this a previous visit.  The patient has also signed a consent for treatment with a controlled substance as per [facility] policy. KESHAWN was obtained.    Patient's Body mass index is 30.18 kg/m². BMI is above normal parameters. Recommendations include: educational material.      PHQ-2/PHQ-9 Depression Screening 3/16/2021   Little interest or pleasure in doing things 0   Feeling down, depressed, or hopeless 0   Trouble falling or staying asleep, or sleeping too much -   Feeling tired or having little energy -   Poor appetite or overeating -    Feeling bad about yourself - or that you are a failure or have let yourself or your family down -   Trouble concentrating on things, such as reading the newspaper or watching television -   Moving or speaking so slowly that other people could have noticed. Or the opposite - being so fidgety or restless that you have been moving around a lot more than usual -   Thoughts that you would be better off dead, or of hurting yourself in some way -   Total Score 0   If you checked off any problems, how difficult have these problems made it for you to do your work, take care of things at home, or get along with other people? -         Lab on 03/12/2021   Component Date Value Ref Range Status   • Glucose 03/12/2021 98  70 - 99 mg/dL Final   • BUN 03/12/2021 20  7 - 23 mg/dL Final   • Creatinine 03/12/2021 0.77  0.52 - 1.04 mg/dL Final   • Sodium 03/12/2021 139  137 - 145 mmol/L Final   • Potassium 03/12/2021 4.4  3.4 - 5.0 mmol/L Final   • Chloride 03/12/2021 106  101 - 112 mmol/L Final   • CO2 03/12/2021 28.0  22.0 - 30.0 mmol/L Final   • Calcium 03/12/2021 9.2  8.4 - 10.2 mg/dL Final   • Total Protein 03/12/2021 6.9  6.3 - 8.6 g/dL Final   • Albumin 03/12/2021 4.00  3.50 - 5.00 g/dL Final   • ALT (SGPT) 03/12/2021 14  <=35 U/L Final   • AST (SGOT) 03/12/2021 25  14 - 36 U/L Final   • Alkaline Phosphatase 03/12/2021 82  38 - 126 U/L Final   • Total Bilirubin 03/12/2021 0.8  0.2 - 1.3 mg/dL Final   • eGFR Non African Amer 03/12/2021 73  39 - 90 mL/min/1.73 Final   • Globulin 03/12/2021 2.9  2.3 - 3.5 gm/dL Final   • A/G Ratio 03/12/2021 1.4  1.1 - 1.8 g/dL Final   • BUN/Creatinine Ratio 03/12/2021 26.0* 7.0 - 25.0 Final   • Anion Gap 03/12/2021 5.0  5.0 - 15.0 mmol/L Final   • Free T4 03/12/2021 0.99  0.93 - 1.70 ng/dL Final   • TSH 03/12/2021 1.900  0.270 - 4.200 uIU/mL Final   • Color, UA 03/12/2021 Yellow  Yellow, Straw Final   • Appearance, UA 03/12/2021 Slightly Cloudy* Clear Final   • pH, UA 03/12/2021 5.5  5.5 - 8.0  Final   • Specific Gravity, UA 03/12/2021 >=1.030  1.005 - 1.030 Final   • Glucose, UA 03/12/2021 Negative  Negative Final   • Ketones, UA 03/12/2021 Negative  Negative Final   • Bilirubin, UA 03/12/2021 Negative  Negative Final   • Blood, UA 03/12/2021 Trace* Negative Final   • Protein, UA 03/12/2021 Negative  Negative Final   • Leuk Esterase, UA 03/12/2021 Small (1+)* Negative Final   • Nitrite, UA 03/12/2021 Negative  Negative Final   • Urobilinogen, UA 03/12/2021 1.0 E.U./dL  0.2 - 1.0 E.U./dL Final   • Total Cholesterol 03/12/2021 172  150 - 200 mg/dL Final   • Triglycerides 03/12/2021 82  <=150 mg/dL Final   • HDL Cholesterol 03/12/2021 56  40 - 59 mg/dL Final   • LDL Cholesterol  03/12/2021 101* <=100 mg/dL Final   • VLDL Cholesterol 03/12/2021 15  5 - 40 mg/dL Final   • LDL/HDL Ratio 03/12/2021 1.78  0.00 - 3.22 Final   • 25 Hydroxy, Vitamin D 03/12/2021 64.7  30.0 - 100.0 ng/ml Final   • WBC 03/12/2021 6.74  3.40 - 10.80 10*3/mm3 Final   • RBC 03/12/2021 4.60  3.77 - 5.28 10*6/mm3 Final   • Hemoglobin 03/12/2021 12.8  12.0 - 15.9 g/dL Final   • Hematocrit 03/12/2021 39.9  34.0 - 46.6 % Final   • MCV 03/12/2021 86.7  79.0 - 97.0 fL Final   • MCH 03/12/2021 27.8  26.6 - 33.0 pg Final   • MCHC 03/12/2021 32.1  31.5 - 35.7 g/dL Final   • RDW 03/12/2021 13.0  12.3 - 15.4 % Final   • RDW-SD 03/12/2021 40.7  37.0 - 54.0 fl Final   • MPV 03/12/2021 11.6  6.0 - 12.0 fL Final   • Platelets 03/12/2021 201  140 - 450 10*3/mm3 Final   • RBC, UA 03/12/2021 0-2* None Seen /HPF Final   • WBC, UA 03/12/2021 13-20* None Seen /HPF Final   • Bacteria, UA 03/12/2021 1+* None Seen /HPF Final   • Squamous Epithelial Cells, UA 03/12/2021 13-20* None Seen, 0-2 /HPF Final   • Hyaline Casts, UA 03/12/2021 None Seen  None Seen /LPF Final   • Methodology 03/12/2021 Manual Light Microscopy   Final   • Urine Culture 03/12/2021 No growth   Final   • Neutrophil % 03/12/2021 59.0  42.7 - 76.0 % Final   • Lymphocyte % 03/12/2021 27.0  19.6 -  45.3 % Final   • Monocyte % 03/12/2021 9.0  5.0 - 12.0 % Final   • Eosinophil % 03/12/2021 4.0  0.3 - 6.2 % Final   • Bands %  03/12/2021 1.0  0.0 - 5.0 % Final   • Neutrophils Absolute 03/12/2021 4.04  1.70 - 7.00 10*3/mm3 Final   • Lymphocytes Absolute 03/12/2021 1.82  0.70 - 3.10 10*3/mm3 Final   • Monocytes Absolute 03/12/2021 0.61  0.10 - 0.90 10*3/mm3 Final   • Eosinophils Absolute 03/12/2021 0.27  0.00 - 0.40 10*3/mm3 Final   • RBC Morphology 03/12/2021 Normal  Normal Final   • WBC Morphology 03/12/2021 Normal  Normal Final   • Platelet Estimate 03/12/2021 Adequate  Normal Final   ].  .  .  .

## 2021-03-16 NOTE — PROGRESS NOTES
QUICK REFERENCE INFORMATION:  The ABCs of the Annual Wellness Visit    Subsequent Medicare Wellness Visit    HEALTH RISK ASSESSMENT    1945    Recent Hospitalizations:  No hospitalization(s) within the last year..        Current Medical Providers:  Patient Care Team:  Trung Collins MD as PCP - General (Family Medicine)        Smoking Status:  Social History     Tobacco Use   Smoking Status Former Smoker   • Years: 20.00   • Quit date: 2011   • Years since quittin.5   Smokeless Tobacco Never Used       Alcohol Consumption:  Social History     Substance and Sexual Activity   Alcohol Use No       Depression Screen:   PHQ-2/PHQ-9 Depression Screening 3/16/2021   Little interest or pleasure in doing things 0   Feeling down, depressed, or hopeless 0   Trouble falling or staying asleep, or sleeping too much -   Feeling tired or having little energy -   Poor appetite or overeating -   Feeling bad about yourself - or that you are a failure or have let yourself or your family down -   Trouble concentrating on things, such as reading the newspaper or watching television -   Moving or speaking so slowly that other people could have noticed. Or the opposite - being so fidgety or restless that you have been moving around a lot more than usual -   Thoughts that you would be better off dead, or of hurting yourself in some way -   Total Score 0   If you checked off any problems, how difficult have these problems made it for you to do your work, take care of things at home, or get along with other people? -       Health Habits and Functional and Cognitive Screening:  Functional & Cognitive Status 3/16/2021   Do you have difficulty preparing food and eating? No   Do you have difficulty bathing yourself, getting dressed or grooming yourself? Yes   Do you have difficulty using the toilet? No   Do you have difficulty moving around from place to place? Yes   Do you have trouble with steps or getting out of a bed or a  chair? Yes   Current Diet Unhealthy Diet   Dental Exam Up to date   Eye Exam Up to date   Exercise (times per week) 0 times per week   Current Exercises Include No Regular Exercise   Current Exercise Activities Include -   Do you need help using the phone?  No   Are you deaf or do you have serious difficulty hearing?  No   Do you need help with transportation? No   Do you need help shopping? No   Do you need help preparing meals?  No   Do you need help with housework?  No   Do you need help with laundry? No   Do you need help taking your medications? No   Do you need help managing money? No   Do you ever drive or ride in a car without wearing a seat belt? No   Have you felt unusual stress, anger or loneliness in the last month? No   Who do you live with? Spouse   If you need help, do you have trouble finding someone available to you? No   Have you been bothered in the last four weeks by sexual problems? No   Do you have difficulty concentrating, remembering or making decisions? Yes           Does the patient have evidence of cognitive impairment? No    Asiprin use counseling: Taking ASA appropriately as indicated      Recent Lab Results:    Visual Acuity:  No exam data present    Age-appropriate Screening Schedule:  Refer to the list below for future screening recommendations based on patient's age, sex and/or medical conditions. Orders for these recommended tests are listed in the plan section. The patient has been provided with a written plan.    Health Maintenance   Topic Date Due   • ZOSTER VACCINE (2 of 2) 07/26/2017   • INFLUENZA VACCINE  09/17/2021 (Originally 8/1/2020)   • DXA SCAN  08/09/2021   • LIPID PANEL  03/12/2022   • MAMMOGRAM  12/17/2022   • COLONOSCOPY  08/30/2026   • TDAP/TD VACCINES (2 - Td) 05/31/2027        Subjective   History of Present Illness    Eusebia Braden is a 75 y.o. female who presents for an Annual Wellness Visit.    The following portions of the patient's history were reviewed  and updated as appropriate: allergies, current medications, past family history, past medical history, past social history, past surgical history and problem list.    Outpatient Medications Prior to Visit   Medication Sig Dispense Refill   • acetaminophen (TYLENOL) 500 MG tablet Take 1,000 mg by mouth Every 8 (Eight) Hours As Needed for Mild Pain .     • aspirin 325 MG tablet Take 325 mg by mouth 2 (Two) Times a Day.     • citalopram (CeleXA) 20 MG tablet TAKE 1 TABLET BY MOUTH DAILY. 90 tablet 3   • loratadine (CLARITIN) 10 MG tablet Take 10 mg by mouth daily.     • LORazepam (ATIVAN) 0.5 MG tablet TAKE 1 TABLET BY MOUTH EVERY 8 (EIGHT) HOURS AS NEEDED FOR ANXIETY. 60 tablet 1   • vitamin D (ERGOCALCIFEROL) 1.25 MG (78787 UT) capsule capsule TAKE 1 CAPSULE BY MOUTH 1 (ONE) TIME PER WEEK. 12 capsule 3   • atorvastatin (LIPITOR) 20 MG tablet Take 1 tablet by mouth Every Night. 90 tablet 3   • gabapentin (NEURONTIN) 300 MG capsule 1 cap each morning and afternoon, and 2 caps at bedtime. 360 capsule 1   • meloxicam (MOBIC) 15 MG tablet Take 1 tablet by mouth Daily. 90 tablet 3   • HYDROcodone-acetaminophen (NORCO) 7.5-325 MG per tablet Take 1 tablet by mouth Every 6 (Six) Hours As Needed for Moderate Pain . 30 tablet 0     No facility-administered medications prior to visit.       Patient Active Problem List   Diagnosis   • Peripheral polyneuropathy   • Primary osteoarthritis involving multiple joints   • Mixed hyperlipidemia   • Chronic nonseasonal allergic rhinitis due to pollen   • Generalized anxiety disorder   • Vitamin D deficiency       Advance Care Planning:   ACP Discussion Status: ACP discussion was held with the patient during this visit. Patient does not have an advance directive, declines further assistance.      Identification of Risk Factors:  Risk factors include: depression.    Review of Systems    Compared to one year ago, the patient feels her physical health is the same.  Compared to one year ago,  "the patient feels her mental health is the same.    Objective     Physical Exam    Vitals:    03/16/21 1046   BP: 118/78   Pulse: 72   Temp: 97.4 °F (36.3 °C)   TempSrc: Oral   SpO2: 99%   Weight: 84.8 kg (187 lb)   Height: 167.6 cm (66\")   PainSc: 0-No pain   PainLoc: Generalized       Patient's Body mass index is 30.18 kg/m². BMI is within normal parameters. No follow-up required..      Assessment/Plan   Patient Self-Management and Personalized Health Advice  The patient has been provided with information about: diet and exercise and preventive services including:   · Diabetes screening, see lab orders, Exercise counseling provided, Fall Risk assessment done, Nutrition counseling provided.  · Recommend Shingrix vaccine for shingles.  Recommend COVID-19 vaccine.  Recommend annual influenza vaccine.    Visit Diagnoses:    ICD-10-CM ICD-9-CM   1. Medicare annual wellness visit, subsequent  Z00.00 V70.0   2. Mixed hyperlipidemia  E78.2 272.2   3. Vitamin D deficiency  E55.9 268.9   4. Primary osteoarthritis involving multiple joints  M89.49 715.98   5. Peripheral polyneuropathy  G62.9 356.9   6. Generalized anxiety disorder  F41.1 300.02   7. Drug therapy  Z79.899 V58.69       Orders Placed This Encounter   Procedures   • Comprehensive Metabolic Panel     Standing Status:   Future     Standing Expiration Date:   3/16/2022   • T4, Free     Standing Status:   Future     Standing Expiration Date:   3/16/2022   • TSH     Standing Status:   Future     Standing Expiration Date:   3/16/2022   • Urinalysis With Culture If Indicated -     Standing Status:   Future     Standing Expiration Date:   3/16/2022   • LDL Cholesterol, Direct     Standing Status:   Future     Standing Expiration Date:   3/16/2022   • ToxASSURE Select 13 Discrete -     Standing Status:   Future     Standing Expiration Date:   3/16/2022   • CBC & Differential     Standing Status:   Future     Standing Expiration Date:   3/16/2022     Order Specific " Question:   Manual Differential     Answer:   No       Outpatient Encounter Medications as of 3/16/2021   Medication Sig Dispense Refill   • acetaminophen (TYLENOL) 500 MG tablet Take 1,000 mg by mouth Every 8 (Eight) Hours As Needed for Mild Pain .     • aspirin 325 MG tablet Take 325 mg by mouth 2 (Two) Times a Day.     • atorvastatin (LIPITOR) 20 MG tablet Take 1 tablet by mouth Every Night. 90 tablet 3   • citalopram (CeleXA) 20 MG tablet TAKE 1 TABLET BY MOUTH DAILY. 90 tablet 3   • gabapentin (NEURONTIN) 300 MG capsule 1 cap each morning and afternoon, and 2 caps at bedtime. 360 capsule 1   • loratadine (CLARITIN) 10 MG tablet Take 10 mg by mouth daily.     • LORazepam (ATIVAN) 0.5 MG tablet TAKE 1 TABLET BY MOUTH EVERY 8 (EIGHT) HOURS AS NEEDED FOR ANXIETY. 60 tablet 1   • meloxicam (MOBIC) 15 MG tablet Take 1 tablet by mouth Daily. 90 tablet 3   • vitamin D (ERGOCALCIFEROL) 1.25 MG (75781 UT) capsule capsule TAKE 1 CAPSULE BY MOUTH 1 (ONE) TIME PER WEEK. 12 capsule 3   • [DISCONTINUED] atorvastatin (LIPITOR) 20 MG tablet Take 1 tablet by mouth Every Night. 90 tablet 3   • [DISCONTINUED] gabapentin (NEURONTIN) 300 MG capsule 1 cap each morning and afternoon, and 2 caps at bedtime. 360 capsule 1   • [DISCONTINUED] meloxicam (MOBIC) 15 MG tablet Take 1 tablet by mouth Daily. 90 tablet 3   • [DISCONTINUED] HYDROcodone-acetaminophen (NORCO) 7.5-325 MG per tablet Take 1 tablet by mouth Every 6 (Six) Hours As Needed for Moderate Pain . 30 tablet 0     No facility-administered encounter medications on file as of 3/16/2021.       Reviewed use of high risk medication in the elderly: yes  Reviewed for potential of harmful drug interactions in the elderly: yes    Follow Up:  Return in about 6 months (around 9/16/2021) for Next scheduled follow up, Or sooner as needed With Labs prior to appointment.     An After Visit Summary and PPPS with all of these plans were given to the patient.

## 2021-04-07 DIAGNOSIS — F41.1 GENERALIZED ANXIETY DISORDER: Chronic | ICD-10-CM

## 2021-04-07 RX ORDER — LORAZEPAM 0.5 MG/1
0.5 TABLET ORAL EVERY 8 HOURS PRN
Qty: 60 TABLET | Refills: 0 | Status: SHIPPED | OUTPATIENT
Start: 2021-04-07 | End: 2021-06-04

## 2021-06-04 DIAGNOSIS — F41.1 GENERALIZED ANXIETY DISORDER: Chronic | ICD-10-CM

## 2021-06-04 RX ORDER — LORAZEPAM 0.5 MG/1
0.5 TABLET ORAL EVERY 8 HOURS PRN
Qty: 60 TABLET | Refills: 0 | Status: SHIPPED | OUTPATIENT
Start: 2021-06-04 | End: 2021-08-11

## 2021-08-10 DIAGNOSIS — F41.1 GENERALIZED ANXIETY DISORDER: Chronic | ICD-10-CM

## 2021-08-11 RX ORDER — LORAZEPAM 0.5 MG/1
0.5 TABLET ORAL EVERY 8 HOURS PRN
Qty: 60 TABLET | Refills: 0 | Status: SHIPPED | OUTPATIENT
Start: 2021-08-11 | End: 2021-09-14 | Stop reason: SDUPTHER

## 2021-09-14 ENCOUNTER — LAB (OUTPATIENT)
Dept: LAB | Facility: OTHER | Age: 76
End: 2021-09-14

## 2021-09-14 ENCOUNTER — OFFICE VISIT (OUTPATIENT)
Dept: FAMILY MEDICINE CLINIC | Facility: CLINIC | Age: 76
End: 2021-09-14

## 2021-09-14 VITALS
TEMPERATURE: 97.7 F | BODY MASS INDEX: 31.18 KG/M2 | DIASTOLIC BLOOD PRESSURE: 78 MMHG | HEART RATE: 73 BPM | HEIGHT: 66 IN | WEIGHT: 194 LBS | SYSTOLIC BLOOD PRESSURE: 132 MMHG | OXYGEN SATURATION: 94 %

## 2021-09-14 DIAGNOSIS — M15.9 PRIMARY OSTEOARTHRITIS INVOLVING MULTIPLE JOINTS: Chronic | ICD-10-CM

## 2021-09-14 DIAGNOSIS — G89.29 CHRONIC PAIN OF BOTH KNEES: ICD-10-CM

## 2021-09-14 DIAGNOSIS — E55.9 VITAMIN D DEFICIENCY: Chronic | ICD-10-CM

## 2021-09-14 DIAGNOSIS — Z79.899 DRUG THERAPY: ICD-10-CM

## 2021-09-14 DIAGNOSIS — G62.9 PERIPHERAL POLYNEUROPATHY: Chronic | ICD-10-CM

## 2021-09-14 DIAGNOSIS — M25.561 CHRONIC PAIN OF BOTH KNEES: ICD-10-CM

## 2021-09-14 DIAGNOSIS — E78.2 MIXED HYPERLIPIDEMIA: Primary | Chronic | ICD-10-CM

## 2021-09-14 DIAGNOSIS — E78.2 MIXED HYPERLIPIDEMIA: ICD-10-CM

## 2021-09-14 DIAGNOSIS — M25.562 CHRONIC PAIN OF BOTH KNEES: ICD-10-CM

## 2021-09-14 DIAGNOSIS — F41.1 GENERALIZED ANXIETY DISORDER: Chronic | ICD-10-CM

## 2021-09-14 LAB
ALBUMIN SERPL-MCNC: 4.1 G/DL (ref 3.5–5)
ALBUMIN/GLOB SERPL: 1.4 G/DL (ref 1.1–1.8)
ALP SERPL-CCNC: 83 U/L (ref 38–126)
ALT SERPL W P-5'-P-CCNC: 16 U/L
ANION GAP SERPL CALCULATED.3IONS-SCNC: 6 MMOL/L (ref 5–15)
ARTICHOKE IGE QN: 130 MG/DL (ref 0–100)
AST SERPL-CCNC: 23 U/L (ref 14–36)
BACTERIA UR QL AUTO: ABNORMAL /HPF
BASOPHILS # BLD AUTO: 0.09 10*3/MM3 (ref 0–0.2)
BASOPHILS NFR BLD AUTO: 1.3 % (ref 0–1.5)
BILIRUB SERPL-MCNC: 0.8 MG/DL (ref 0.2–1.3)
BILIRUB UR QL STRIP: NEGATIVE
BUN SERPL-MCNC: 18 MG/DL (ref 7–23)
BUN/CREAT SERPL: 22.5 (ref 7–25)
CALCIUM SPEC-SCNC: 9.4 MG/DL (ref 8.4–10.2)
CHLORIDE SERPL-SCNC: 107 MMOL/L (ref 101–112)
CLARITY UR: ABNORMAL
CO2 SERPL-SCNC: 27 MMOL/L (ref 22–30)
COLOR UR: ABNORMAL
CREAT SERPL-MCNC: 0.8 MG/DL (ref 0.52–1.04)
DEPRECATED RDW RBC AUTO: 40.8 FL (ref 37–54)
EOSINOPHIL # BLD AUTO: 0.21 10*3/MM3 (ref 0–0.4)
EOSINOPHIL NFR BLD AUTO: 3 % (ref 0.3–6.2)
ERYTHROCYTE [DISTWIDTH] IN BLOOD BY AUTOMATED COUNT: 13.1 % (ref 12.3–15.4)
GFR SERPL CREATININE-BSD FRML MDRD: 69 ML/MIN/1.73 (ref 39–90)
GLOBULIN UR ELPH-MCNC: 2.9 GM/DL (ref 2.3–3.5)
GLUCOSE SERPL-MCNC: 97 MG/DL (ref 70–99)
GLUCOSE UR STRIP-MCNC: NEGATIVE MG/DL
HCT VFR BLD AUTO: 39.8 % (ref 34–46.6)
HGB BLD-MCNC: 12.7 G/DL (ref 12–15.9)
HGB UR QL STRIP.AUTO: ABNORMAL
HYALINE CASTS UR QL AUTO: ABNORMAL /LPF
KETONES UR QL STRIP: NEGATIVE
LEUKOCYTE ESTERASE UR QL STRIP.AUTO: ABNORMAL
LYMPHOCYTES # BLD AUTO: 1.84 10*3/MM3 (ref 0.7–3.1)
LYMPHOCYTES NFR BLD AUTO: 26.5 % (ref 19.6–45.3)
MCH RBC QN AUTO: 27.7 PG (ref 26.6–33)
MCHC RBC AUTO-ENTMCNC: 31.9 G/DL (ref 31.5–35.7)
MCV RBC AUTO: 86.9 FL (ref 79–97)
MONOCYTES # BLD AUTO: 0.73 10*3/MM3 (ref 0.1–0.9)
MONOCYTES NFR BLD AUTO: 10.5 % (ref 5–12)
MUCOUS THREADS URNS QL MICRO: ABNORMAL /HPF
NEUTROPHILS NFR BLD AUTO: 4.08 10*3/MM3 (ref 1.7–7)
NEUTROPHILS NFR BLD AUTO: 58.7 % (ref 42.7–76)
NITRITE UR QL STRIP: NEGATIVE
PH UR STRIP.AUTO: <=5 [PH] (ref 5.5–8)
PLATELET # BLD AUTO: 203 10*3/MM3 (ref 140–450)
PMV BLD AUTO: 11.2 FL (ref 6–12)
POTASSIUM SERPL-SCNC: 4.3 MMOL/L (ref 3.4–5)
PROT SERPL-MCNC: 7 G/DL (ref 6.3–8.6)
PROT UR QL STRIP: ABNORMAL
RBC # BLD AUTO: 4.58 10*6/MM3 (ref 3.77–5.28)
RBC # UR: ABNORMAL /HPF
REF LAB TEST METHOD: ABNORMAL
SODIUM SERPL-SCNC: 140 MMOL/L (ref 137–145)
SP GR UR STRIP: >=1.03 (ref 1–1.03)
SQUAMOUS #/AREA URNS HPF: ABNORMAL /HPF
T4 FREE SERPL-MCNC: 1.08 NG/DL (ref 0.93–1.7)
TSH SERPL DL<=0.05 MIU/L-ACNC: 3.63 UIU/ML (ref 0.27–4.2)
UROBILINOGEN UR QL STRIP: ABNORMAL
WBC # BLD AUTO: 6.95 10*3/MM3 (ref 3.4–10.8)
WBC UR QL AUTO: ABNORMAL /HPF

## 2021-09-14 PROCEDURE — 81001 URINALYSIS AUTO W/SCOPE: CPT | Performed by: INTERNAL MEDICINE

## 2021-09-14 PROCEDURE — 36415 COLL VENOUS BLD VENIPUNCTURE: CPT | Performed by: INTERNAL MEDICINE

## 2021-09-14 PROCEDURE — 80053 COMPREHEN METABOLIC PANEL: CPT | Performed by: INTERNAL MEDICINE

## 2021-09-14 PROCEDURE — 87086 URINE CULTURE/COLONY COUNT: CPT | Performed by: INTERNAL MEDICINE

## 2021-09-14 PROCEDURE — 84443 ASSAY THYROID STIM HORMONE: CPT | Performed by: INTERNAL MEDICINE

## 2021-09-14 PROCEDURE — 85025 COMPLETE CBC W/AUTO DIFF WBC: CPT | Performed by: INTERNAL MEDICINE

## 2021-09-14 PROCEDURE — 99214 OFFICE O/P EST MOD 30 MIN: CPT | Performed by: INTERNAL MEDICINE

## 2021-09-14 PROCEDURE — 83721 ASSAY OF BLOOD LIPOPROTEIN: CPT | Performed by: INTERNAL MEDICINE

## 2021-09-14 PROCEDURE — 80307 DRUG TEST PRSMV CHEM ANLYZR: CPT | Performed by: INTERNAL MEDICINE

## 2021-09-14 PROCEDURE — 84439 ASSAY OF FREE THYROXINE: CPT | Performed by: INTERNAL MEDICINE

## 2021-09-14 PROCEDURE — G0481 DRUG TEST DEF 8-14 CLASSES: HCPCS | Performed by: INTERNAL MEDICINE

## 2021-09-14 RX ORDER — CAMPHOR 0.45 %
GEL (GRAM) TOPICAL
COMMUNITY
Start: 2021-06-21 | End: 2022-06-22

## 2021-09-14 RX ORDER — GABAPENTIN 300 MG/1
CAPSULE ORAL
Qty: 360 CAPSULE | Refills: 1 | Status: SHIPPED | OUTPATIENT
Start: 2021-09-14 | End: 2022-03-29 | Stop reason: SDUPTHER

## 2021-09-14 RX ORDER — TRIAMCINOLONE ACETONIDE 0.25 MG/G
OINTMENT TOPICAL
COMMUNITY
Start: 2021-06-21 | End: 2022-07-19

## 2021-09-14 RX ORDER — LORAZEPAM 0.5 MG/1
0.5 TABLET ORAL EVERY 8 HOURS PRN
Qty: 60 TABLET | Refills: 3 | Status: SHIPPED | OUTPATIENT
Start: 2021-09-14 | End: 2022-03-29 | Stop reason: SDUPTHER

## 2021-09-14 NOTE — PROGRESS NOTES
Chief Complaint   Patient presents with   • Hyperlipidemia     6 month follow up    • Anxiety   • Osteoarthritis   • Polyneuropathy     Subjective   Eusebia Braden is a 76 y.o. female who presents to the office for follow-up and review of labs.  Her labs were drawn just prior to her appointment, so most results are still pending.  She has hyperlipidemia and takes Lipitor 20 mg daily.  She has osteoarthritis which causes chronic low back and joint pain.  She takes meloxicam 15 mg daily, and this is effective for her arthritic pain.  She has neuropathic pain secondary to the osteoarthritis in her low back.  She takes gabapentin 300 mg each morning and afternoon, and 600 mg at bedtime.  This keeps her neuropathic pain at a tolerable level. She has anxiety and takes Celexa 20 mg daily.  She also takes lorazepam 0.5 mg up to 3 times daily as needed for increased anxiety.  She usually only has to take this once daily.  She has vitamin D deficiency and takes a weekly dose of ergocalciferol.    She complains of increased arthritic pain in her knees.  She is having a lot of difficulty standing from a seated position.  She is having to use a walker for ambulation.  She has difficulty going up and down stairs.  She is wanting to see someone for consult regarding treatment options for the osteoarthritis in her knees.  She had hip surgery done by Dr. Sparks in [place], and would like to see him again.    History of Present Illness has been reviewed and validated on 09/14/2021 and updated with any changes.    The following portions of the patient's history were reviewed and updated as appropriate: allergies, current medications, past family history, past medical history, past social history, past surgical history and problem list.    Review of Systems   Constitutional: Negative for chills, fatigue and fever.   HENT: Negative for congestion, sneezing, sore throat and trouble swallowing.    Eyes: Negative for visual  "disturbance.   Respiratory: Negative for cough, chest tightness, shortness of breath and wheezing.    Cardiovascular: Negative for chest pain, palpitations and leg swelling.   Gastrointestinal: Negative for abdominal pain, constipation, diarrhea, nausea and vomiting.   Genitourinary: Negative for dysuria, frequency and urgency.   Musculoskeletal: Positive for arthralgias, back pain and gait problem. Negative for neck pain.   Skin: Negative for rash.   Neurological: Negative for dizziness, weakness and headaches.   Psychiatric/Behavioral:        Patient denies any feelings of depression and has not felt down, hopeless or lost interest in any activities.   All other systems reviewed and are negative.  Review of systems has been reviewed and validated on 09/14/2021 and updated with any changes.      Objective   Vitals:    09/14/21 0945   BP: 132/78   BP Location: Left arm   Patient Position: Sitting   Cuff Size: Adult   Pulse: 73  Comment: regular   Temp: 97.7 °F (36.5 °C)   TempSrc: Tympanic   SpO2: 94%   Weight: 88 kg (194 lb)   Height: 167.6 cm (66\")   PainSc:   6   PainLoc: Knee  Comment: bilat      Body mass index is 31.31 kg/m².    Physical Exam   Constitutional: She is oriented to person, place, and time. She appears well-developed.   HENT:   Head: Normocephalic and atraumatic.   Eyes: Pupils are equal, round, and reactive to light. Conjunctivae are normal. Right eye exhibits no discharge. Left eye exhibits no discharge. No scleral icterus.   Cardiovascular: Normal rate, regular rhythm and normal heart sounds. Exam reveals no gallop and no friction rub.   No murmur heard.  Pulmonary/Chest: Effort normal and breath sounds normal. No respiratory distress. She has no wheezes. She has no rales.   Musculoskeletal:      Lumbar back: She exhibits pain.      Comments: She has crepitus in the knees bilaterally.  There is also muscle weakness in the knees.  She has difficulty standing from a seated position and has to use " her arms to help push herself up out of the chair.   Neurological: She is alert and oriented to person, place, and time. No cranial nerve deficit. Gait abnormal.   Skin: Skin is warm and dry. No rash noted.   Psychiatric: Her behavior is normal. Judgment and thought content normal.   Nursing note and vitals reviewed.  Physical exam has been reviewed and validated on 09/14/2021 and updated with any changes.    Assessment/Plan   Diagnoses and all orders for this visit:    1. Mixed hyperlipidemia (Primary)  -     CBC & Differential; Future  -     Comprehensive Metabolic Panel; Future  -     T4, Free; Future  -     TSH; Future  -     Urinalysis With Culture If Indicated -; Future  -     Lipid Panel; Future    2. Vitamin D deficiency  -     Vitamin D 25 Hydroxy; Future    3. Primary osteoarthritis involving multiple joints    4. Peripheral polyneuropathy  -     gabapentin (NEURONTIN) 300 MG capsule; 1 cap each morning and afternoon, and 2 caps at bedtime.  Dispense: 360 capsule; Refill: 1    5. Generalized anxiety disorder  -     LORazepam (ATIVAN) 0.5 MG tablet; Take 1 tablet by mouth Every 8 (Eight) Hours As Needed for Anxiety.  Dispense: 60 tablet; Refill: 3    6. Chronic pain of both knees  -     Ambulatory Referral to Orthopedic Surgery         Labs are reviewed with patient.  Most results are still pending. She will continue with Lipitor 20 mg daily for treatment of hyperlipidemia.  She will continue with meloxicam 15 mg daily for treatment of osteoarthritis. She will continue with gabapentin 300 mg each morning and afternoon, and 600 mg at bedtime for treatment of the neuropathic pain. She will continue with citalopram 20 mg daily and lorazepam 0.5 mg every 8 hours as needed for anxiety and depression. She will continue with the weekly dose of ergocalciferol for treatment of her vitamin D deficiency.    Patient understands the risks associated with this controlled medication, including tolerance and addiction.   Patient also agrees to only obtain this medication from me, and not from a another provider, unless that provider is covering for me in my absence.  Patient also agrees to be compliant in dosing, and not self adjust the dose of medication.  A signed controlled substance agreement is on file, and the patient has received a controlled substance education sheet at this a previous visit.  The patient has also signed a consent for treatment with a controlled substance as per [facility] policy. KESHAWN was obtained.      PHQ-2/PHQ-9 Depression Screening 9/14/2021   Little interest or pleasure in doing things 0   Feeling down, depressed, or hopeless 1   Trouble falling or staying asleep, or sleeping too much -   Feeling tired or having little energy -   Poor appetite or overeating -   Feeling bad about yourself - or that you are a failure or have let yourself or your family down -   Trouble concentrating on things, such as reading the newspaper or watching television -   Moving or speaking so slowly that other people could have noticed. Or the opposite - being so fidgety or restless that you have been moving around a lot more than usual -   Thoughts that you would be better off dead, or of hurting yourself in some way -   Total Score 1   If you checked off any problems, how difficult have these problems made it for you to do your work, take care of things at home, or get along with other people? -         Lab on 09/14/2021   Component Date Value Ref Range Status   • Glucose 09/14/2021 97  70 - 99 mg/dL Final   • BUN 09/14/2021 18  7 - 23 mg/dL Final   • Creatinine 09/14/2021 0.80  0.52 - 1.04 mg/dL Final   • Sodium 09/14/2021 140  137 - 145 mmol/L Final   • Potassium 09/14/2021 4.3  3.4 - 5.0 mmol/L Final   • Chloride 09/14/2021 107  101 - 112 mmol/L Final   • CO2 09/14/2021 27.0  22.0 - 30.0 mmol/L Final   • Calcium 09/14/2021 9.4  8.4 - 10.2 mg/dL Final   • Total Protein 09/14/2021 7.0  6.3 - 8.6 g/dL Final   •  Albumin 09/14/2021 4.10  3.50 - 5.00 g/dL Final   • ALT (SGPT) 09/14/2021 16  <=35 U/L Final   • AST (SGOT) 09/14/2021 23  14 - 36 U/L Final   • Alkaline Phosphatase 09/14/2021 83  38 - 126 U/L Final   • Total Bilirubin 09/14/2021 0.8  0.2 - 1.3 mg/dL Final   • eGFR Non  Amer 09/14/2021 69  39 - 90 mL/min/1.73 Final   • Globulin 09/14/2021 2.9  2.3 - 3.5 gm/dL Final   • A/G Ratio 09/14/2021 1.4  1.1 - 1.8 g/dL Final   • BUN/Creatinine Ratio 09/14/2021 22.5  7.0 - 25.0 Final   • Anion Gap 09/14/2021 6.0  5.0 - 15.0 mmol/L Final   • Color, UA 09/14/2021 Dark Yellow* Yellow, Straw Final   • Appearance, UA 09/14/2021 Slightly Cloudy* Clear Final   • pH, UA 09/14/2021 <=5.0* 5.5 - 8.0 Final   • Specific Gravity, UA 09/14/2021 >=1.030  1.005 - 1.030 Final   • Glucose, UA 09/14/2021 Negative  Negative Final   • Ketones, UA 09/14/2021 Negative  Negative Final   • Bilirubin, UA 09/14/2021 Negative  Negative Final   • Blood, UA 09/14/2021 Trace* Negative Final   • Protein, UA 09/14/2021 30 mg/dL (1+)* Negative Final   • Leuk Esterase, UA 09/14/2021 Small (1+)* Negative Final   • Nitrite, UA 09/14/2021 Negative  Negative Final   • Urobilinogen, UA 09/14/2021 0.2 E.U./dL  0.2 - 1.0 E.U./dL Final   • WBC 09/14/2021 6.95  3.40 - 10.80 10*3/mm3 Final   • RBC 09/14/2021 4.58  3.77 - 5.28 10*6/mm3 Final   • Hemoglobin 09/14/2021 12.7  12.0 - 15.9 g/dL Final   • Hematocrit 09/14/2021 39.8  34.0 - 46.6 % Final   • MCV 09/14/2021 86.9  79.0 - 97.0 fL Final   • MCH 09/14/2021 27.7  26.6 - 33.0 pg Final   • MCHC 09/14/2021 31.9  31.5 - 35.7 g/dL Final   • RDW 09/14/2021 13.1  12.3 - 15.4 % Final   • RDW-SD 09/14/2021 40.8  37.0 - 54.0 fl Final   • MPV 09/14/2021 11.2  6.0 - 12.0 fL Final   • Platelets 09/14/2021 203  140 - 450 10*3/mm3 Final   • Neutrophil % 09/14/2021 58.7  42.7 - 76.0 % Final   • Lymphocyte % 09/14/2021 26.5  19.6 - 45.3 % Final   • Monocyte % 09/14/2021 10.5  5.0 - 12.0 % Final   • Eosinophil % 09/14/2021 3.0   0.3 - 6.2 % Final   • Basophil % 09/14/2021 1.3  0.0 - 1.5 % Final   • Neutrophils, Absolute 09/14/2021 4.08  1.70 - 7.00 10*3/mm3 Final   • Lymphocytes, Absolute 09/14/2021 1.84  0.70 - 3.10 10*3/mm3 Final   • Monocytes, Absolute 09/14/2021 0.73  0.10 - 0.90 10*3/mm3 Final   • Eosinophils, Absolute 09/14/2021 0.21  0.00 - 0.40 10*3/mm3 Final   • Basophils, Absolute 09/14/2021 0.09  0.00 - 0.20 10*3/mm3 Final   • RBC, UA 09/14/2021 3-5* None Seen /HPF Final   • WBC, UA 09/14/2021 21-30* None Seen /HPF Final   • Bacteria, UA 09/14/2021 Trace* None Seen /HPF Final   • Squamous Epithelial Cells, UA 09/14/2021 3-6* None Seen, 0-2 /HPF Final   • Hyaline Casts, UA 09/14/2021 None Seen  None Seen /LPF Final   • Mucus, UA 09/14/2021 Small/1+* None Seen, Trace /HPF Final   • Methodology 09/14/2021 Manual Light Microscopy   Final   ].  .  .  .

## 2021-09-15 LAB — BACTERIA SPEC AEROBE CULT: NORMAL

## 2021-09-20 LAB
6MAM UR QL CFM: NEGATIVE
6MAM/CREAT UR: NOT DETECTED NG/MG CREAT
7AMINOCLONAZEPAM/CREAT UR: NOT DETECTED NG/MG CREAT
A-OH ALPRAZ/CREAT UR: NOT DETECTED NG/MG CREAT
A-OH-TRIAZOLAM/CREAT UR CFM: NOT DETECTED NG/MG CREAT
ALFENTANIL/CREAT UR CFM: NOT DETECTED NG/MG CREAT
ALPHA-HYDROXYMIDAZOLAM, URINE: NOT DETECTED NG/MG CREAT
ALPRAZ/CREAT UR CFM: NOT DETECTED NG/MG CREAT
AMOBARBITAL UR QL CFM: NOT DETECTED
AMPHET/CREAT UR: NOT DETECTED NG/MG CREAT
AMPHETAMINES UR QL CFM: NEGATIVE
BARBITAL UR QL CFM: NOT DETECTED
BARBITURATES UR QL CFM: NEGATIVE
BENZODIAZ UR QL CFM: NORMAL
BUPRENORPHINE UR QL CFM: NEGATIVE
BUPRENORPHINE/CREAT UR: NOT DETECTED NG/MG CREAT
BUTABARBITAL UR QL CFM: NOT DETECTED
BUTALBITAL UR QL CFM: NOT DETECTED
BZE/CREAT UR: NOT DETECTED NG/MG CREAT
CANNABINOIDS UR QL CFM: NEGATIVE
CARBOXYTHC/CREAT UR: NOT DETECTED NG/MG CREAT
CLONAZEPAM/CREAT UR CFM: NOT DETECTED NG/MG CREAT
COCAETHYLENE/CREAT UR CFM: NOT DETECTED NG/MG CREAT
COCAINE UR QL CFM: NEGATIVE
COCAINE/CREAT UR CFM: NOT DETECTED NG/MG CREAT
CODEINE/CREAT UR: NOT DETECTED NG/MG CREAT
CREAT UR-MCNC: 245 MG/DL
DESALKYLFLURAZ/CREAT UR: NOT DETECTED NG/MG CREAT
DESMETHYLFLUNITRAZEPAM: NOT DETECTED NG/MG CREAT
DHC/CREAT UR: NOT DETECTED NG/MG CREAT
DIAZEPAM/CREAT UR: NOT DETECTED NG/MG CREAT
DRUGS UR: NORMAL
EDDP/CREAT UR: NOT DETECTED NG/MG CREAT
ETHANOL UR CFM-MCNC: NOT DETECTED G/DL
ETHANOL UR QL CFM: NEGATIVE
FENTANYL UR QL CFM: NEGATIVE
FENTANYL/CREAT UR: NOT DETECTED NG/MG CREAT
FLUNITRAZEPAM UR QL CFM: NOT DETECTED NG/MG CREAT
HYDROCODONE/CREAT UR: NOT DETECTED NG/MG CREAT
HYDROMORPHONE/CREAT UR: NOT DETECTED NG/MG CREAT
LEVEL OF DETECTION:: NORMAL
LORAZEPAM/CREAT UR: 388 NG/MG CREAT
MDA/CREAT UR: NOT DETECTED NG/MG CREAT
MDMA/CREAT UR: NOT DETECTED NG/MG CREAT
MEPHOBARBITAL UR QL CFM: NOT DETECTED
METHADONE UR QL CFM: NEGATIVE
METHADONE/CREAT UR: NOT DETECTED NG/MG CREAT
METHAMPHET/CREAT UR: NOT DETECTED NG/MG CREAT
MIDAZOLAM/CREAT UR CFM: NOT DETECTED NG/MG CREAT
MORPHINE/CREAT UR: NOT DETECTED NG/MG CREAT
N-NORTRAMADOL/CREAT UR CFM: NOT DETECTED NG/MG CREAT
NARCOTICS UR: NEGATIVE
NORBUPRENORPHINE/CREAT UR: NOT DETECTED NG/MG CREAT
NORCODEINE/CREAT UR CFM: NOT DETECTED NG/MG CREAT
NORDIAZEPAM/CREAT UR: NOT DETECTED NG/MG CREAT
NORFENTANYL/CREAT UR: NOT DETECTED NG/MG CREAT
NORHYDROCODONE/CREAT UR: NOT DETECTED NG/MG CREAT
NORMORPHINE UR-MCNC: NOT DETECTED NG/MG CREAT
NOROXYCODONE/CREAT UR: NOT DETECTED NG/MG CREAT
NOROXYMORPHONE/CREAT UR CFM: NOT DETECTED NG/MG CREAT
O-NORTRAMADOL UR CFM-MCNC: NOT DETECTED NG/MG CREAT
OPIATES UR QL CFM: NEGATIVE
OXAZEPAM/CREAT UR: NOT DETECTED NG/MG CREAT
OXYCODONE UR QL CFM: NEGATIVE
OXYCODONE/CREAT UR: NOT DETECTED NG/MG CREAT
OXYMORPHONE/CREAT UR: NOT DETECTED NG/MG CREAT
PENTOBARB UR QL CFM: NOT DETECTED
PHENOBARB UR QL CFM: NOT DETECTED
SECOBARBITAL UR QL CFM: NOT DETECTED
SUFENTANIL/CREAT UR CFM: NOT DETECTED NG/MG CREAT
TAPENTADOL UR QL CFM: NEGATIVE
TAPENTADOL/CREAT UR: NOT DETECTED NG/MG CREAT
TEMAZEPAM/CREAT UR: NOT DETECTED NG/MG CREAT
THIOPENTAL UR QL CFM: NOT DETECTED
TRAMADOL UR QL CFM: NOT DETECTED NG/MG CREAT

## 2022-02-11 ENCOUNTER — PRIOR AUTHORIZATION (OUTPATIENT)
Dept: FAMILY MEDICINE CLINIC | Facility: CLINIC | Age: 77
End: 2022-02-11

## 2022-03-01 DIAGNOSIS — E55.9 VITAMIN D DEFICIENCY: Primary | ICD-10-CM

## 2022-03-01 RX ORDER — ERGOCALCIFEROL 1.25 MG/1
50000 CAPSULE ORAL WEEKLY
Qty: 12 CAPSULE | Refills: 3 | Status: SHIPPED | OUTPATIENT
Start: 2022-03-01 | End: 2023-02-07 | Stop reason: SDUPTHER

## 2022-03-25 ENCOUNTER — LAB (OUTPATIENT)
Dept: LAB | Facility: OTHER | Age: 77
End: 2022-03-25

## 2022-03-25 DIAGNOSIS — E55.9 VITAMIN D DEFICIENCY: Chronic | ICD-10-CM

## 2022-03-25 DIAGNOSIS — E78.2 MIXED HYPERLIPIDEMIA: ICD-10-CM

## 2022-03-25 LAB
ALBUMIN SERPL-MCNC: 4 G/DL (ref 3.5–5)
ALBUMIN/GLOB SERPL: 1.3 G/DL (ref 1.1–1.8)
ALP SERPL-CCNC: 85 U/L (ref 38–126)
ALT SERPL W P-5'-P-CCNC: 14 U/L
ANION GAP SERPL CALCULATED.3IONS-SCNC: 5 MMOL/L (ref 5–15)
AST SERPL-CCNC: 26 U/L (ref 14–36)
BACTERIA UR QL AUTO: ABNORMAL /HPF
BASOPHILS # BLD AUTO: 0.08 10*3/MM3 (ref 0–0.2)
BASOPHILS NFR BLD AUTO: 1 % (ref 0–1.5)
BILIRUB SERPL-MCNC: 0.7 MG/DL (ref 0.2–1.3)
BILIRUB UR QL STRIP: NEGATIVE
BUN SERPL-MCNC: 19 MG/DL (ref 7–23)
BUN/CREAT SERPL: 25 (ref 7–25)
CALCIUM SPEC-SCNC: 9.4 MG/DL (ref 8.4–10.2)
CHLORIDE SERPL-SCNC: 106 MMOL/L (ref 101–112)
CHOLEST SERPL-MCNC: 161 MG/DL (ref 150–200)
CLARITY UR: ABNORMAL
CO2 SERPL-SCNC: 29 MMOL/L (ref 22–30)
COLOR UR: YELLOW
CREAT SERPL-MCNC: 0.76 MG/DL (ref 0.52–1.04)
DEPRECATED RDW RBC AUTO: 41 FL (ref 37–54)
EGFRCR SERPLBLD CKD-EPI 2021: 80.3 ML/MIN/1.73
EOSINOPHIL # BLD AUTO: 0.34 10*3/MM3 (ref 0–0.4)
EOSINOPHIL NFR BLD AUTO: 4.3 % (ref 0.3–6.2)
ERYTHROCYTE [DISTWIDTH] IN BLOOD BY AUTOMATED COUNT: 13.2 % (ref 12.3–15.4)
GLOBULIN UR ELPH-MCNC: 3.2 GM/DL (ref 2.3–3.5)
GLUCOSE SERPL-MCNC: 95 MG/DL (ref 70–99)
GLUCOSE UR STRIP-MCNC: NEGATIVE MG/DL
HCT VFR BLD AUTO: 40.3 % (ref 34–46.6)
HDLC SERPL-MCNC: 59 MG/DL (ref 40–59)
HGB BLD-MCNC: 12.7 G/DL (ref 12–15.9)
HGB UR QL STRIP.AUTO: ABNORMAL
HYALINE CASTS UR QL AUTO: ABNORMAL /LPF
KETONES UR QL STRIP: NEGATIVE
LDLC SERPL CALC-MCNC: 86 MG/DL
LDLC/HDLC SERPL: 1.44 {RATIO} (ref 0–3.22)
LEUKOCYTE ESTERASE UR QL STRIP.AUTO: ABNORMAL
LYMPHOCYTES # BLD AUTO: 1.49 10*3/MM3 (ref 0.7–3.1)
LYMPHOCYTES NFR BLD AUTO: 19 % (ref 19.6–45.3)
MCH RBC QN AUTO: 27.5 PG (ref 26.6–33)
MCHC RBC AUTO-ENTMCNC: 31.5 G/DL (ref 31.5–35.7)
MCV RBC AUTO: 87.4 FL (ref 79–97)
MONOCYTES # BLD AUTO: 0.79 10*3/MM3 (ref 0.1–0.9)
MONOCYTES NFR BLD AUTO: 10.1 % (ref 5–12)
NEUTROPHILS NFR BLD AUTO: 5.13 10*3/MM3 (ref 1.7–7)
NEUTROPHILS NFR BLD AUTO: 65.6 % (ref 42.7–76)
NITRITE UR QL STRIP: POSITIVE
PH UR STRIP.AUTO: 6 [PH] (ref 5.5–8)
PLATELET # BLD AUTO: 229 10*3/MM3 (ref 140–450)
PMV BLD AUTO: 10.6 FL (ref 6–12)
POTASSIUM SERPL-SCNC: 4.3 MMOL/L (ref 3.4–5)
PROT SERPL-MCNC: 7.2 G/DL (ref 6.3–8.6)
PROT UR QL STRIP: ABNORMAL
RBC # BLD AUTO: 4.61 10*6/MM3 (ref 3.77–5.28)
RBC # UR STRIP: ABNORMAL /HPF
REF LAB TEST METHOD: ABNORMAL
SODIUM SERPL-SCNC: 140 MMOL/L (ref 137–145)
SP GR UR STRIP: >=1.03 (ref 1–1.03)
SQUAMOUS #/AREA URNS HPF: ABNORMAL /HPF
TRIGL SERPL-MCNC: 86 MG/DL
UROBILINOGEN UR QL STRIP: ABNORMAL
VLDLC SERPL-MCNC: 16 MG/DL (ref 5–40)
WBC # UR STRIP: ABNORMAL /HPF
WBC NRBC COR # BLD: 7.83 10*3/MM3 (ref 3.4–10.8)

## 2022-03-25 PROCEDURE — 36415 COLL VENOUS BLD VENIPUNCTURE: CPT | Performed by: INTERNAL MEDICINE

## 2022-03-25 PROCEDURE — 87086 URINE CULTURE/COLONY COUNT: CPT | Performed by: INTERNAL MEDICINE

## 2022-03-25 PROCEDURE — 87186 SC STD MICRODIL/AGAR DIL: CPT | Performed by: INTERNAL MEDICINE

## 2022-03-25 PROCEDURE — 80053 COMPREHEN METABOLIC PANEL: CPT | Performed by: INTERNAL MEDICINE

## 2022-03-25 PROCEDURE — 85025 COMPLETE CBC W/AUTO DIFF WBC: CPT | Performed by: INTERNAL MEDICINE

## 2022-03-25 PROCEDURE — 80061 LIPID PANEL: CPT | Performed by: INTERNAL MEDICINE

## 2022-03-25 PROCEDURE — 81001 URINALYSIS AUTO W/SCOPE: CPT | Performed by: INTERNAL MEDICINE

## 2022-03-25 PROCEDURE — 84439 ASSAY OF FREE THYROXINE: CPT | Performed by: INTERNAL MEDICINE

## 2022-03-25 PROCEDURE — 82306 VITAMIN D 25 HYDROXY: CPT | Performed by: INTERNAL MEDICINE

## 2022-03-25 PROCEDURE — 84443 ASSAY THYROID STIM HORMONE: CPT | Performed by: INTERNAL MEDICINE

## 2022-03-25 PROCEDURE — 87077 CULTURE AEROBIC IDENTIFY: CPT | Performed by: INTERNAL MEDICINE

## 2022-03-26 LAB
25(OH)D3 SERPL-MCNC: 57.1 NG/ML (ref 30–100)
T4 FREE SERPL-MCNC: 1.04 NG/DL (ref 0.93–1.7)
TSH SERPL DL<=0.05 MIU/L-ACNC: 1.29 UIU/ML (ref 0.27–4.2)

## 2022-03-27 LAB — BACTERIA SPEC AEROBE CULT: ABNORMAL

## 2022-03-28 ENCOUNTER — TELEPHONE (OUTPATIENT)
Dept: FAMILY MEDICINE CLINIC | Facility: CLINIC | Age: 77
End: 2022-03-28

## 2022-03-28 DIAGNOSIS — R82.79 POSITIVE URINE CULTURE: Primary | ICD-10-CM

## 2022-03-28 RX ORDER — SULFAMETHOXAZOLE AND TRIMETHOPRIM 800; 160 MG/1; MG/1
1 TABLET ORAL 2 TIMES DAILY
Qty: 14 TABLET | Refills: 0 | Status: SHIPPED | OUTPATIENT
Start: 2022-03-28 | End: 2022-04-04

## 2022-03-29 ENCOUNTER — OFFICE VISIT (OUTPATIENT)
Dept: FAMILY MEDICINE CLINIC | Facility: CLINIC | Age: 77
End: 2022-03-29

## 2022-03-29 VITALS
HEIGHT: 66 IN | HEART RATE: 74 BPM | SYSTOLIC BLOOD PRESSURE: 122 MMHG | WEIGHT: 189 LBS | BODY MASS INDEX: 30.37 KG/M2 | DIASTOLIC BLOOD PRESSURE: 78 MMHG | TEMPERATURE: 97.8 F | OXYGEN SATURATION: 98 %

## 2022-03-29 DIAGNOSIS — G62.9 PERIPHERAL POLYNEUROPATHY: Chronic | ICD-10-CM

## 2022-03-29 DIAGNOSIS — Z00.00 MEDICARE ANNUAL WELLNESS VISIT, SUBSEQUENT: Primary | ICD-10-CM

## 2022-03-29 DIAGNOSIS — B96.20 E. COLI UTI: ICD-10-CM

## 2022-03-29 DIAGNOSIS — E78.2 MIXED HYPERLIPIDEMIA: Chronic | ICD-10-CM

## 2022-03-29 DIAGNOSIS — F33.42 MAJOR DEPRESSIVE DISORDER, RECURRENT, IN FULL REMISSION: Chronic | ICD-10-CM

## 2022-03-29 DIAGNOSIS — E55.9 VITAMIN D DEFICIENCY: Chronic | ICD-10-CM

## 2022-03-29 DIAGNOSIS — N39.0 E. COLI UTI: ICD-10-CM

## 2022-03-29 DIAGNOSIS — M15.9 PRIMARY OSTEOARTHRITIS INVOLVING MULTIPLE JOINTS: Chronic | ICD-10-CM

## 2022-03-29 DIAGNOSIS — F41.1 GENERALIZED ANXIETY DISORDER: Chronic | ICD-10-CM

## 2022-03-29 PROCEDURE — G0439 PPPS, SUBSEQ VISIT: HCPCS | Performed by: INTERNAL MEDICINE

## 2022-03-29 PROCEDURE — 1159F MED LIST DOCD IN RCRD: CPT | Performed by: INTERNAL MEDICINE

## 2022-03-29 RX ORDER — LORAZEPAM 0.5 MG/1
0.5 TABLET ORAL EVERY 8 HOURS PRN
Qty: 60 TABLET | Refills: 3 | Status: SHIPPED | OUTPATIENT
Start: 2022-03-29 | End: 2022-11-01 | Stop reason: SDUPTHER

## 2022-03-29 RX ORDER — MELOXICAM 15 MG/1
15 TABLET ORAL DAILY
Qty: 90 TABLET | Refills: 3 | Status: SHIPPED | OUTPATIENT
Start: 2022-03-29 | End: 2022-07-19

## 2022-03-29 RX ORDER — CITALOPRAM 20 MG/1
20 TABLET ORAL DAILY
Qty: 90 TABLET | Refills: 3 | Status: SHIPPED | OUTPATIENT
Start: 2022-03-29

## 2022-03-29 RX ORDER — GABAPENTIN 300 MG/1
CAPSULE ORAL
Qty: 360 CAPSULE | Refills: 1 | Status: SHIPPED | OUTPATIENT
Start: 2022-03-29 | End: 2022-07-10

## 2022-03-29 NOTE — PROGRESS NOTES
QUICK REFERENCE INFORMATION:  The ABCs of the Annual Wellness Visit    Subsequent Medicare Wellness Visit    HEALTH RISK ASSESSMENT    1945    Recent Hospitalizations:  No hospitalization(s) within the last year..        Current Medical Providers:  Patient Care Team:  Trung Collins MD as PCP - General (Family Medicine)  Adalberto Sparks MD as Surgeon (Orthopedic Surgery)        Smoking Status:  Social History     Tobacco Use   Smoking Status Former Smoker   • Packs/day: 1.00   • Years: 20.00   • Pack years: 20.00   • Quit date: 8/16/2011   • Years since quitting: 10.6   Smokeless Tobacco Never Used       Alcohol Consumption:  Social History     Substance and Sexual Activity   Alcohol Use No       Depression Screen:   PHQ-2/PHQ-9 Depression Screening 3/29/2022   Retired PHQ-9 Total Score -   Retired Total Score -   Little Interest or Pleasure in Doing Things 0-->not at all   Feeling Down, Depressed or Hopeless 1-->several days   PHQ-9: Brief Depression Severity Measure Score 1       Health Habits and Functional and Cognitive Screening:  Functional & Cognitive Status 3/29/2022   Do you have difficulty preparing food and eating? Yes   Do you have difficulty bathing yourself, getting dressed or grooming yourself? Yes   Do you have difficulty using the toilet? Yes   Do you have difficulty moving around from place to place? Yes   Do you have trouble with steps or getting out of a bed or a chair? Yes   Current Diet Well Balanced Diet   Dental Exam Not up to date   Eye Exam Not up to date   Exercise (times per week) 0 times per week   Current Exercises Include No Regular Exercise   Current Exercise Activities Include -   Do you need help using the phone?  No   Are you deaf or do you have serious difficulty hearing?  No   Do you need help with transportation? Yes   Do you need help shopping? Yes   Do you need help preparing meals?  Yes   Do you need help with housework?  Yes   Do you need help with laundry? Yes    Do you need help taking your medications? No   Do you need help managing money? No   Do you ever drive or ride in a car without wearing a seat belt? No   Have you felt unusual stress, anger or loneliness in the last month? No   Who do you live with? Spouse   If you need help, do you have trouble finding someone available to you? No   Have you been bothered in the last four weeks by sexual problems? No   Do you have difficulty concentrating, remembering or making decisions? Yes           Does the patient have evidence of cognitive impairment? No    Asiprin use counseling: Taking ASA appropriately as indicated      Recent Lab Results:    Visual Acuity:  No exam data present    Age-appropriate Screening Schedule:  Refer to the list below for future screening recommendations based on patient's age, sex and/or medical conditions. Orders for these recommended tests are listed in the plan section. The patient has been provided with a written plan.    Health Maintenance   Topic Date Due   • ZOSTER VACCINE (2 of 2) 07/26/2017   • DXA SCAN  08/09/2021   • INFLUENZA VACCINE  03/29/2023 (Originally 8/1/2021)   • LIPID PANEL  03/25/2023   • MAMMOGRAM  06/21/2023   • TDAP/TD VACCINES (3 - Td or Tdap) 05/31/2027        Subjective   History of Present Illness    Eusebia Braden is a 76 y.o. female who presents for an Annual Wellness Visit.    The following portions of the patient's history were reviewed and updated as appropriate: allergies, current medications, past family history, past medical history, past social history, past surgical history and problem list.    Outpatient Medications Prior to Visit   Medication Sig Dispense Refill   • acetaminophen (TYLENOL) 500 MG tablet Take 1,000 mg by mouth Every 8 (Eight) Hours As Needed for Mild Pain .     • atorvastatin (LIPITOR) 20 MG tablet Take 1 tablet by mouth Every Night. 90 tablet 3   • diphenhydrAMINE-zinc acetate (Benadryl Itch Stopping) 1-0.1 % cream Apply  topically to  the appropriate area as directed.     • loratadine (CLARITIN) 10 MG tablet Take 10 mg by mouth daily.     • sulfamethoxazole-trimethoprim (Bactrim DS) 800-160 MG per tablet Take 1 tablet by mouth 2 (Two) Times a Day for 7 days. 14 tablet 0   • triamcinolone (KENALOG) 0.025 % ointment      • vitamin D (ERGOCALCIFEROL) 1.25 MG (28508 UT) capsule capsule TAKE 1 CAPSULE BY MOUTH 1 (ONE) TIME PER WEEK. 12 capsule 3   • citalopram (CeleXA) 20 MG tablet TAKE 1 TABLET BY MOUTH DAILY. 90 tablet 3   • gabapentin (NEURONTIN) 300 MG capsule 1 cap each morning and afternoon, and 2 caps at bedtime. 360 capsule 1   • LORazepam (ATIVAN) 0.5 MG tablet Take 1 tablet by mouth Every 8 (Eight) Hours As Needed for Anxiety. 60 tablet 3   • meloxicam (MOBIC) 15 MG tablet Take 1 tablet by mouth Daily. 90 tablet 3   • aspirin 325 MG tablet Take 325 mg by mouth 2 (Two) Times a Day.       No facility-administered medications prior to visit.       Patient Active Problem List   Diagnosis   • Peripheral polyneuropathy   • Primary osteoarthritis involving multiple joints   • Mixed hyperlipidemia   • Chronic nonseasonal allergic rhinitis due to pollen   • Generalized anxiety disorder   • Vitamin D deficiency   • Major depressive disorder, recurrent, in full remission (HCC)       Advance Care Planning:   ACP Discussion Status: ACP discussion was held with the patient during this visit. Patient does not have an advance directive, declines further assistance.      Identification of Risk Factors:  Risk factors include: depression.    Review of Systems    Compared to one year ago, the patient feels her physical health is worse.  Compared to one year ago, the patient feels her mental health is the same.    Objective     Physical Exam    Vitals:    03/29/22 1025   BP: 122/78   BP Location: Left arm   Patient Position: Sitting   Cuff Size: Large Adult   Pulse: 74   Temp: 97.8 °F (36.6 °C)   TempSrc: Temporal   SpO2: 98%   Weight: 85.7 kg (189 lb)   Height:  "167.6 cm (66\")   PainSc:   7   PainLoc: Knee       Patient's Body mass index is 30.51 kg/m². indicating that she is obese (BMI >30). Obesity-related health conditions include the following: dyslipidemias and osteoarthritis. Obesity is unchanged. BMI is is above average; BMI management plan is completed. We discussed portion control and increasing exercise..      Assessment/Plan   Patient Self-Management and Personalized Health Advice  The patient has been provided with information about: diet and exercise and preventive services including:   · Diabetes screening, see lab orders, Exercise counseling provided, Fall Risk assessment done, Nutrition counseling provided.  · Recommend annual influenza vaccine  · Recommend Shingrix vaccine for shingles  · Recommend COVID-19 booster dose    Visit Diagnoses:    ICD-10-CM ICD-9-CM   1. Medicare annual wellness visit, subsequent  Z00.00 V70.0   2. Mixed hyperlipidemia  E78.2 272.2   3. Vitamin D deficiency  E55.9 268.9   4. Peripheral polyneuropathy  G62.9 356.9   5. Primary osteoarthritis involving multiple joints  M89.49 715.98   6. Generalized anxiety disorder  F41.1 300.02   7. Major depressive disorder, recurrent, in full remission (HCC)  F33.42 296.36   8. E. coli UTI  N39.0 599.0    B96.20 041.49       Orders Placed This Encounter   Procedures   • Comprehensive Metabolic Panel     Standing Status:   Future     Standing Expiration Date:   3/29/2023     Order Specific Question:   Release to patient     Answer:   Immediate   • T4, Free     Standing Status:   Future     Standing Expiration Date:   3/29/2023     Order Specific Question:   Release to patient     Answer:   Immediate   • TSH     Standing Status:   Future     Standing Expiration Date:   3/29/2023     Order Specific Question:   Release to patient     Answer:   Immediate   • Urinalysis With Culture If Indicated -     Standing Status:   Future     Standing Expiration Date:   3/29/2023     Order Specific Question:   " Release to patient     Answer:   Immediate   • LDL Cholesterol, Direct     Standing Status:   Future     Standing Expiration Date:   3/29/2023     Order Specific Question:   Release to patient     Answer:   Immediate   • Vitamin D 25 Hydroxy     Standing Status:   Future     Standing Expiration Date:   3/29/2023     Order Specific Question:   Release to patient     Answer:   Immediate   • CBC & Differential     Standing Status:   Future     Standing Expiration Date:   3/29/2023     Order Specific Question:   Manual Differential     Answer:   No       Outpatient Encounter Medications as of 3/29/2022   Medication Sig Dispense Refill   • acetaminophen (TYLENOL) 500 MG tablet Take 1,000 mg by mouth Every 8 (Eight) Hours As Needed for Mild Pain .     • atorvastatin (LIPITOR) 20 MG tablet Take 1 tablet by mouth Every Night. 90 tablet 3   • citalopram (CeleXA) 20 MG tablet Take 1 tablet by mouth Daily. 90 tablet 3   • diphenhydrAMINE-zinc acetate (Benadryl Itch Stopping) 1-0.1 % cream Apply  topically to the appropriate area as directed.     • gabapentin (NEURONTIN) 300 MG capsule 1 cap each morning and afternoon, and 2 caps at bedtime. 360 capsule 1   • loratadine (CLARITIN) 10 MG tablet Take 10 mg by mouth daily.     • LORazepam (ATIVAN) 0.5 MG tablet Take 1 tablet by mouth Every 8 (Eight) Hours As Needed for Anxiety. 60 tablet 3   • meloxicam (MOBIC) 15 MG tablet Take 1 tablet by mouth Daily. 90 tablet 3   • sulfamethoxazole-trimethoprim (Bactrim DS) 800-160 MG per tablet Take 1 tablet by mouth 2 (Two) Times a Day for 7 days. 14 tablet 0   • triamcinolone (KENALOG) 0.025 % ointment      • vitamin D (ERGOCALCIFEROL) 1.25 MG (14779 UT) capsule capsule TAKE 1 CAPSULE BY MOUTH 1 (ONE) TIME PER WEEK. 12 capsule 3   • [DISCONTINUED] citalopram (CeleXA) 20 MG tablet TAKE 1 TABLET BY MOUTH DAILY. 90 tablet 3   • [DISCONTINUED] gabapentin (NEURONTIN) 300 MG capsule 1 cap each morning and afternoon, and 2 caps at bedtime. 360  capsule 1   • [DISCONTINUED] LORazepam (ATIVAN) 0.5 MG tablet Take 1 tablet by mouth Every 8 (Eight) Hours As Needed for Anxiety. 60 tablet 3   • [DISCONTINUED] meloxicam (MOBIC) 15 MG tablet Take 1 tablet by mouth Daily. 90 tablet 3   • aspirin 325 MG tablet Take 325 mg by mouth 2 (Two) Times a Day.       No facility-administered encounter medications on file as of 3/29/2022.       Reviewed use of high risk medication in the elderly: yes  Reviewed for potential of harmful drug interactions in the elderly: yes    Follow Up:  Return in about 6 months (around 9/29/2022) for Next scheduled follow up, Or sooner as needed With Labs prior to appointment.     An After Visit Summary and PPPS with all of these plans were given to the patient.

## 2022-03-29 NOTE — PROGRESS NOTES
Chief Complaint   Patient presents with   • Medicare Wellness-subsequent   • Hyperlipidemia   • Anxiety   • Depression   • Osteoarthritis   • Peripheral polyneuropathy   • Vitamin D Deficiency     Subjective   Eusebia Braden is a 76 y.o. female who presents to the office for follow-up and review of labs. She has hyperlipidemia and takes Lipitor 20 mg daily.  She has osteoarthritis which causes chronic low back and joint pain.  She takes meloxicam 15 mg daily, and this is effective for her arthritic pain.  She has neuropathic pain secondary to the osteoarthritis in her low back.  She takes gabapentin 300 mg each morning and afternoon, and 600 mg at bedtime.  This keeps her neuropathic pain at a tolerable level. She has depression and anxiety, and takes Celexa 20 mg daily.  She also takes lorazepam 0.5 mg up to 3 times daily as needed for increased anxiety.  She usually only has to take this once daily.  Her depression is in full remission.  She has vitamin D deficiency and takes a weekly dose of ergocalciferol.  She has osteoarthritis which also affects her knees.  She follows with orthopedics for this, and has an appointment later this week.  After having her labs on 3/25/2022, her urine culture was positive for E. coli.  This has been treated with Bactrim DS.    History of Present Illness has been reviewed and validated on 03/29/2022 and updated with any changes.    The following portions of the patient's history were reviewed and updated as appropriate: allergies, current medications, past family history, past medical history, past social history, past surgical history and problem list.    Review of Systems   Constitutional: Negative for chills, fatigue and fever.   HENT: Negative for congestion, sneezing, sore throat and trouble swallowing.    Eyes: Negative for visual disturbance.   Respiratory: Negative for cough, chest tightness, shortness of breath and wheezing.    Cardiovascular: Negative for chest pain,  "palpitations and leg swelling.   Gastrointestinal: Negative for abdominal pain, constipation, diarrhea, nausea and vomiting.   Genitourinary: Negative for dysuria, frequency and urgency.   Musculoskeletal: Positive for arthralgias, back pain and gait problem. Negative for neck pain.   Skin: Negative for rash.   Neurological: Negative for dizziness, weakness and headaches.   Psychiatric/Behavioral:        Patient denies any feelings of depression and has not felt down, hopeless or lost interest in any activities.   All other systems reviewed and are negative.  Review of systems has been reviewed and validated on 03/29/2022 and updated with any changes.      Objective   Vitals:    03/29/22 1025   BP: 122/78   BP Location: Left arm   Patient Position: Sitting   Cuff Size: Large Adult   Pulse: 74  Comment: regular   Temp: 97.8 °F (36.6 °C)   TempSrc: Temporal   SpO2: 98%   Weight: 85.7 kg (189 lb)   Height: 167.6 cm (66\")   PainSc:   7   PainLoc: Knee  Comment: bilat      Body mass index is 30.51 kg/m².    Physical Exam   Constitutional: She is oriented to person, place, and time. She appears well-developed.   HENT:   Head: Normocephalic and atraumatic.   Eyes: Pupils are equal, round, and reactive to light. Conjunctivae are normal. Right eye exhibits no discharge. Left eye exhibits no discharge. No scleral icterus.   Cardiovascular: Normal rate, regular rhythm and normal heart sounds. Exam reveals no gallop and no friction rub.   No murmur heard.  Pulmonary/Chest: Effort normal and breath sounds normal. No respiratory distress. She has no wheezes. She has no rales.   Musculoskeletal:      Lumbar back: She exhibits pain.      Comments: She has crepitus in the knees bilaterally.  There is also muscle weakness in the knees.  She has difficulty standing from a seated position and has to use her arms to help push herself up out of the chair.   Neurological: She is alert and oriented to person, place, and time. No cranial " nerve deficit. Gait abnormal.   She uses a rolling walker to assist with ambulation.   Skin: Skin is warm and dry. No rash noted.   Psychiatric: Her behavior is normal. Judgment and thought content normal.   Nursing note and vitals reviewed.  Physical exam has been reviewed and validated on 03/29/2022 and updated with any changes.    Assessment/Plan   Diagnoses and all orders for this visit:    1. Medicare annual wellness visit, subsequent (Primary)    2. Mixed hyperlipidemia  -     CBC & Differential; Future  -     Comprehensive Metabolic Panel; Future  -     T4, Free; Future  -     TSH; Future  -     Urinalysis With Culture If Indicated -; Future  -     LDL Cholesterol, Direct; Future    3. Vitamin D deficiency  -     Vitamin D 25 Hydroxy; Future    4. Peripheral polyneuropathy  -     gabapentin (NEURONTIN) 300 MG capsule; 1 cap each morning and afternoon, and 2 caps at bedtime.  Dispense: 360 capsule; Refill: 1    5. Primary osteoarthritis involving multiple joints  -     meloxicam (MOBIC) 15 MG tablet; Take 1 tablet by mouth Daily.  Dispense: 90 tablet; Refill: 3    6. Generalized anxiety disorder  -     LORazepam (ATIVAN) 0.5 MG tablet; Take 1 tablet by mouth Every 8 (Eight) Hours As Needed for Anxiety.  Dispense: 60 tablet; Refill: 3  -     citalopram (CeleXA) 20 MG tablet; Take 1 tablet by mouth Daily.  Dispense: 90 tablet; Refill: 3    7. Major depressive disorder, recurrent, in full remission (HCC)    8. E. coli UTI         Labs are reviewed with patient.  Her comprehensive metabolic panel is normal.  Her TSH is normal at 1.290.  Her total cholesterol is 161, LDL 86 and triglycerides 86.  Her HDL is 59.  She will continue with Lipitor 20 mg daily for treatment of hyperlipidemia.  She will continue with meloxicam 15 mg daily for treatment of osteoarthritis. She will continue with gabapentin 300 mg each morning and afternoon, and 600 mg at bedtime for treatment of the neuropathic pain.  She will follow up  with orthopedics as needed.  She will continue with citalopram 20 mg daily and lorazepam 0.5 mg every 8 hours as needed for anxiety and depression.  Her vitamin D level is normal at 57.1.  She will continue with the weekly dose of ergocalciferol for treatment of her vitamin D deficiency.  She will finish out the Bactrim DS for treatment of the E. coli UTI.    Patient understands the risks associated with this controlled medication, including tolerance and addiction.  Patient also agrees to only obtain this medication from me, and not from a another provider, unless that provider is covering for me in my absence.  Patient also agrees to be compliant in dosing, and not self adjust the dose of medication.  A signed controlled substance agreement is on file, and the patient has received a controlled substance education sheet at this a previous visit.  The patient has also signed a consent for treatment with a controlled substance as per [facility] policy. KESHAWN was obtained.      PHQ-2/PHQ-9 Depression Screening 3/29/2022   Retired PHQ-9 Total Score -   Retired Total Score -   Little Interest or Pleasure in Doing Things 0-->not at all   Feeling Down, Depressed or Hopeless 1-->several days   PHQ-9: Brief Depression Severity Measure Score 1         Lab on 03/25/2022   Component Date Value Ref Range Status   • Glucose 03/25/2022 95  70 - 99 mg/dL Final   • BUN 03/25/2022 19  7 - 23 mg/dL Final   • Creatinine 03/25/2022 0.76  0.52 - 1.04 mg/dL Final   • Sodium 03/25/2022 140  137 - 145 mmol/L Final   • Potassium 03/25/2022 4.3  3.4 - 5.0 mmol/L Final   • Chloride 03/25/2022 106  101 - 112 mmol/L Final   • CO2 03/25/2022 29.0  22.0 - 30.0 mmol/L Final   • Calcium 03/25/2022 9.4  8.4 - 10.2 mg/dL Final   • Total Protein 03/25/2022 7.2  6.3 - 8.6 g/dL Final   • Albumin 03/25/2022 4.00  3.50 - 5.00 g/dL Final   • ALT (SGPT) 03/25/2022 14  <=35 U/L Final   • AST (SGOT) 03/25/2022 26  14 - 36 U/L Final   • Alkaline  Phosphatase 03/25/2022 85  38 - 126 U/L Final   • Total Bilirubin 03/25/2022 0.7  0.2 - 1.3 mg/dL Final   • Globulin 03/25/2022 3.2  2.3 - 3.5 gm/dL Final   • A/G Ratio 03/25/2022 1.3  1.1 - 1.8 g/dL Final   • BUN/Creatinine Ratio 03/25/2022 25.0  7.0 - 25.0 Final   • Anion Gap 03/25/2022 5.0  5.0 - 15.0 mmol/L Final   • eGFR 03/25/2022 80.3  >60.0 mL/min/1.73 Final    National Kidney Foundation and American Society of Nephrology (ASN) Task Force recommended calculation based on the Chronic Kidney Disease Epidemiology Collaboration (CKD-EPI) equation refit without adjustment for race.   • Free T4 03/25/2022 1.04  0.93 - 1.70 ng/dL Final   • TSH 03/25/2022 1.290  0.270 - 4.200 uIU/mL Final   • Color, UA 03/25/2022 Yellow  Yellow, Straw Final   • Appearance, UA 03/25/2022 Turbid (A) Clear Final   • pH, UA 03/25/2022 6.0  5.5 - 8.0 Final   • Specific Gravity, UA 03/25/2022 >=1.030  1.005 - 1.030 Final   • Glucose, UA 03/25/2022 Negative  Negative Final   • Ketones, UA 03/25/2022 Negative  Negative Final   • Bilirubin, UA 03/25/2022 Negative  Negative Final   • Blood, UA 03/25/2022 Moderate (2+) (A) Negative Final   • Protein, UA 03/25/2022 100 mg/dL (2+) (A) Negative Final   • Leuk Esterase, UA 03/25/2022 Moderate (2+) (A) Negative Final   • Nitrite, UA 03/25/2022 Positive (A) Negative Final   • Urobilinogen, UA 03/25/2022 0.2 E.U./dL  0.2 - 1.0 E.U./dL Final   • Total Cholesterol 03/25/2022 161  150 - 200 mg/dL Final   • Triglycerides 03/25/2022 86  <=150 mg/dL Final   • HDL Cholesterol 03/25/2022 59  40 - 59 mg/dL Final   • LDL Cholesterol  03/25/2022 86  <=100 mg/dL Final   • VLDL Cholesterol 03/25/2022 16  5 - 40 mg/dL Final   • LDL/HDL Ratio 03/25/2022 1.44  0.00 - 3.22 Final   • 25 Hydroxy, Vitamin D 03/25/2022 57.1  30.0 - 100.0 ng/ml Final   • WBC 03/25/2022 7.83  3.40 - 10.80 10*3/mm3 Final   • RBC 03/25/2022 4.61  3.77 - 5.28 10*6/mm3 Final   • Hemoglobin 03/25/2022 12.7  12.0 - 15.9 g/dL Final   • Hematocrit  03/25/2022 40.3  34.0 - 46.6 % Final   • MCV 03/25/2022 87.4  79.0 - 97.0 fL Final   • MCH 03/25/2022 27.5  26.6 - 33.0 pg Final   • MCHC 03/25/2022 31.5  31.5 - 35.7 g/dL Final   • RDW 03/25/2022 13.2  12.3 - 15.4 % Final   • RDW-SD 03/25/2022 41.0  37.0 - 54.0 fl Final   • MPV 03/25/2022 10.6  6.0 - 12.0 fL Final   • Platelets 03/25/2022 229  140 - 450 10*3/mm3 Final   • Neutrophil % 03/25/2022 65.6  42.7 - 76.0 % Final   • Lymphocyte % 03/25/2022 19.0 (A) 19.6 - 45.3 % Final   • Monocyte % 03/25/2022 10.1  5.0 - 12.0 % Final   • Eosinophil % 03/25/2022 4.3  0.3 - 6.2 % Final   • Basophil % 03/25/2022 1.0  0.0 - 1.5 % Final   • Neutrophils, Absolute 03/25/2022 5.13  1.70 - 7.00 10*3/mm3 Final   • Lymphocytes, Absolute 03/25/2022 1.49  0.70 - 3.10 10*3/mm3 Final   • Monocytes, Absolute 03/25/2022 0.79  0.10 - 0.90 10*3/mm3 Final   • Eosinophils, Absolute 03/25/2022 0.34  0.00 - 0.40 10*3/mm3 Final   • Basophils, Absolute 03/25/2022 0.08  0.00 - 0.20 10*3/mm3 Final   • RBC, UA 03/25/2022 3-5 (A) None Seen /HPF Final   • WBC, UA 03/25/2022 Too Numerous to Count (A) None Seen /HPF Final   • Bacteria, UA 03/25/2022 2+ (A) None Seen /HPF Final   • Squamous Epithelial Cells, UA 03/25/2022 0-2  None Seen, 0-2 /HPF Final   • Hyaline Casts, UA 03/25/2022 None Seen  None Seen /LPF Final   • Methodology 03/25/2022 Manual Light Microscopy   Final   • Urine Culture 03/25/2022 >100,000 CFU/mL Escherichia coli (A)  Final   ].  .  .  .  .

## 2022-05-27 PROBLEM — Z96.651 S/P TOTAL KNEE ARTHROPLASTY, RIGHT: Status: ACTIVE | Noted: 2022-04-20

## 2022-05-27 PROBLEM — Z96.651 S/P TOTAL KNEE ARTHROPLASTY, RIGHT: Status: ACTIVE | Noted: 2022-05-27

## 2022-05-27 PROBLEM — J96.01 ACUTE HYPOXEMIC RESPIRATORY FAILURE DUE TO COVID-19 (HCC): Status: ACTIVE | Noted: 2022-05-07

## 2022-05-27 PROBLEM — U07.1 ACUTE HYPOXEMIC RESPIRATORY FAILURE DUE TO COVID-19 (HCC): Status: ACTIVE | Noted: 2022-05-07

## 2022-05-27 PROBLEM — A41.9 SEPSIS (HCC): Status: ACTIVE | Noted: 2022-05-07

## 2022-06-30 DIAGNOSIS — F41.1 GENERALIZED ANXIETY DISORDER: Chronic | ICD-10-CM

## 2022-06-30 DIAGNOSIS — E55.9 VITAMIN D DEFICIENCY: ICD-10-CM

## 2022-06-30 DIAGNOSIS — G62.9 PERIPHERAL POLYNEUROPATHY: Chronic | ICD-10-CM

## 2022-06-30 DIAGNOSIS — E78.2 MIXED HYPERLIPIDEMIA: Chronic | ICD-10-CM

## 2022-06-30 RX ORDER — ATORVASTATIN CALCIUM 20 MG/1
20 TABLET, FILM COATED ORAL NIGHTLY
Qty: 30 TABLET | Refills: 0 | Status: SHIPPED | OUTPATIENT
Start: 2022-06-30 | End: 2022-07-11

## 2022-06-30 NOTE — TELEPHONE ENCOUNTER
1. Gabapentin 300mg   2. Lipator 20 mg   3. Celexa 20 mg   4. Ativan 0.5mg   5. Vitamin D   [facility]

## 2022-07-08 DIAGNOSIS — G62.9 PERIPHERAL POLYNEUROPATHY: Chronic | ICD-10-CM

## 2022-07-10 RX ORDER — GABAPENTIN 300 MG/1
CAPSULE ORAL
Qty: 360 CAPSULE | Refills: 0 | Status: SHIPPED | OUTPATIENT
Start: 2022-07-10

## 2022-07-11 DIAGNOSIS — E78.2 MIXED HYPERLIPIDEMIA: Chronic | ICD-10-CM

## 2022-07-11 RX ORDER — ATORVASTATIN CALCIUM 20 MG/1
20 TABLET, FILM COATED ORAL NIGHTLY
Qty: 90 TABLET | Refills: 3 | Status: SHIPPED | OUTPATIENT
Start: 2022-07-11

## 2022-07-19 ENCOUNTER — OFFICE VISIT (OUTPATIENT)
Dept: FAMILY MEDICINE CLINIC | Facility: CLINIC | Age: 77
End: 2022-07-19

## 2022-07-19 ENCOUNTER — TELEPHONE (OUTPATIENT)
Dept: FAMILY MEDICINE CLINIC | Facility: CLINIC | Age: 77
End: 2022-07-19

## 2022-07-19 ENCOUNTER — LAB (OUTPATIENT)
Dept: LAB | Facility: OTHER | Age: 77
End: 2022-07-19

## 2022-07-19 VITALS
WEIGHT: 189 LBS | OXYGEN SATURATION: 94 % | DIASTOLIC BLOOD PRESSURE: 78 MMHG | HEIGHT: 66 IN | SYSTOLIC BLOOD PRESSURE: 118 MMHG | HEART RATE: 80 BPM | BODY MASS INDEX: 30.37 KG/M2 | TEMPERATURE: 98.8 F

## 2022-07-19 DIAGNOSIS — Z09 HOSPITAL DISCHARGE FOLLOW-UP: Primary | ICD-10-CM

## 2022-07-19 DIAGNOSIS — K27.9 PEPTIC ULCER DISEASE: ICD-10-CM

## 2022-07-19 DIAGNOSIS — I26.99 BILATERAL PULMONARY EMBOLISM: ICD-10-CM

## 2022-07-19 DIAGNOSIS — D50.0 ANEMIA DUE TO BLOOD LOSS: ICD-10-CM

## 2022-07-19 DIAGNOSIS — Z96.651 S/P TOTAL KNEE ARTHROPLASTY, RIGHT: ICD-10-CM

## 2022-07-19 PROBLEM — J96.01 ACUTE HYPOXEMIC RESPIRATORY FAILURE DUE TO COVID-19 (HCC): Status: RESOLVED | Noted: 2022-05-07 | Resolved: 2022-07-19

## 2022-07-19 PROBLEM — U07.1 ACUTE HYPOXEMIC RESPIRATORY FAILURE DUE TO COVID-19 (HCC): Status: RESOLVED | Noted: 2022-05-07 | Resolved: 2022-07-19

## 2022-07-19 LAB
HCT VFR BLD AUTO: 31.6 % (ref 34–46.6)
HGB BLD-MCNC: 9.4 G/DL (ref 12–15.9)

## 2022-07-19 PROCEDURE — 85014 HEMATOCRIT: CPT | Performed by: INTERNAL MEDICINE

## 2022-07-19 PROCEDURE — 99214 OFFICE O/P EST MOD 30 MIN: CPT | Performed by: INTERNAL MEDICINE

## 2022-07-19 PROCEDURE — 1111F DSCHRG MED/CURRENT MED MERGE: CPT | Performed by: INTERNAL MEDICINE

## 2022-07-19 PROCEDURE — 36415 COLL VENOUS BLD VENIPUNCTURE: CPT | Performed by: INTERNAL MEDICINE

## 2022-07-19 PROCEDURE — 85018 HEMOGLOBIN: CPT | Performed by: INTERNAL MEDICINE

## 2022-07-19 RX ORDER — SUCRALFATE 1 G/1
TABLET ORAL
COMMUNITY
Start: 2022-06-14 | End: 2022-07-19

## 2022-07-19 RX ORDER — PANTOPRAZOLE SODIUM 40 MG/1
40 TABLET, DELAYED RELEASE ORAL DAILY
COMMUNITY
Start: 2022-06-15 | End: 2023-06-16

## 2022-07-19 NOTE — TELEPHONE ENCOUNTER
----- Message from Trung Collins MD sent at 7/19/2022  9:37 AM CDT -----  Notify patient that her hemoglobin and hematocrit are 9.4 and 31.6.  This has improved quite a bit from her previous lab.

## 2022-07-19 NOTE — PROGRESS NOTES
Chief Complaint   Patient presents with   • Hospital Follow Up Visit   • S/P total knee arthroplasty, right   • Peptic Ulcer Disease   • Anemia   • Pulmonary Embolism     Subjective   Eusebia Braden is a 76 y.o. female who presents to the office for a hospital follow-up.  She presented to the ER in [place] on 6/1/2022 with worsening fatigue and weakness.  She had a CTA of the chest which showed bilateral pulmonary emboli.  There was also aneurysmal dilation of the thoracic aorta.  She was transferred to [place] for a higher level of care.  She was admitted to [facility] on 6/1/2022 and discharged on 6/14/2022.  Her recent history has been complicated.  She had a right total knee replacement a couple of months ago.  She then had a hospitalization with a UTI, E. coli bacteremia, and COVID-19 in early May.  On the date of admission she was having shortness of breath and fatigue as described above.  This when she went to the emergency room and was found to have pulmonary emboli.  She was admitted to the hospital in [place] and started on Lovenox.  On 6/2/2022 she underwent embolectomy.  She was then able to wean off of her supplemental oxygen.  Her hemoglobin dropped to 7.5 during her hospitalization.  She had an EGD on 6/2/2022 which showed a large ulcer in the duodenal bulb.  She was started on Protonix and Carafate.  On 6/3/2022 she underwent GDA embolization.  Her GI bleeding then stopped.  She was then transitioned from Lovenox to Eliquis.  Pulmonology was consulted and recommended Eliquis for at least 3 months.  It is felt that the PE was provoked by her recent knee surgery.  She was discharged on 6/14/2022, but still had significant weakness and deconditioning.  She was transferred to the long-term care unit at [facility].    At discharge, new medications were Eliquis 5 mg twice daily, Protonix 40 mg daily and Carafate 1 g 4 times daily x9 days.  Meloxicam  was discontinued.  All of her other routine medications stayed the same.    She was admitted to the long-term care unit at [facility] on 6/14/2022 and discharged on 6/22/2022.  She received physical therapy and slowly improved back to her baseline level of functioning.  Once she met her goals, she was discharged home.  Her discharge medications were the same as admission.  She was also continued on supplemental oxygen at 2 L/min.  She continued with physical therapy on an outpatient basis due to the recent right knee replacement.    On 6/22/2022 she was seen in the ED at [facility] after sustaining a fall at home.  She had a head wound and was also having some low back pain.  She did not lose consciousness from the fall.  CT of the head showed a scalp hematoma in the right parietal region without evidence of underlying fracture.  There was also generalized cortical atrophy and chronic ischemic microvascular disease.  Lumbar spine CT showed no acute fracture.  She was then discharged home.    Today, she reports that she continues to slowly improve.  Her breathing is at baseline.  She continues with physical therapy secondary to the recent knee replacement.       Review of systems has been reviewed and validated on 07/19/2022 and updated with any changes.     The following portions of the patient's history were reviewed and updated as appropriate: allergies, current medications, past family history, past medical history, past social history, past surgical history and problem list.    Review of Systems   Constitutional: Negative for chills, fatigue and fever.   HENT: Negative for congestion, sneezing, sore throat and trouble swallowing.    Eyes: Negative for visual disturbance.   Respiratory: Negative for cough, chest tightness, shortness of breath and wheezing.    Cardiovascular: Negative for chest pain, palpitations and leg swelling.  "  Gastrointestinal: Negative for abdominal pain, constipation, diarrhea, nausea and vomiting.   Genitourinary: Negative for dysuria, frequency and urgency.   Musculoskeletal: Positive for arthralgias, back pain and gait problem. Negative for neck pain.   Skin: Negative for rash.   Neurological: Negative for dizziness, weakness and headaches.   Psychiatric/Behavioral:        Patient denies any feelings of depression and has not felt down, hopeless or lost interest in any activities.   All other systems reviewed and are negative.    Review of systems has been reviewed and validated on 07/19/2022 and updated with any changes.   Objective   Vitals:    07/19/22 0835   BP: 118/78   BP Location: Left arm   Patient Position: Sitting   Cuff Size: Large Adult   Pulse: 80  Comment: regular   Temp: 98.8 °F (37.1 °C)   TempSrc: Temporal   SpO2: 94%   Weight: 85.7 kg (189 lb)   Height: 167.6 cm (66\")   PainSc: 0-No pain     Body mass index is 30.51 kg/m².  Physical Exam  Vitals and nursing note reviewed.   Constitutional:       Appearance: She is well-developed.   HENT:      Head: Normocephalic and atraumatic.      Nose: Nose normal.   Eyes:      General: No scleral icterus.        Right eye: No discharge.         Left eye: No discharge.      Conjunctiva/sclera: Conjunctivae normal.      Pupils: Pupils are equal, round, and reactive to light.   Cardiovascular:      Rate and Rhythm: Normal rate and regular rhythm.      Heart sounds: Normal heart sounds. No murmur heard.    No friction rub. No gallop.   Pulmonary:      Effort: Pulmonary effort is normal. No respiratory distress.      Breath sounds: Normal breath sounds. No wheezing or rales.   Musculoskeletal:      Cervical back: Normal range of motion and neck supple.      Comments: She has difficulty standing from a seated position and has to use her arms to help push herself up out of the chair.  She uses a Rollator walker to assist with ambulation.   Skin:     General: Skin is " warm and dry.      Findings: No rash.   Neurological:      Mental Status: She is alert and oriented to person, place, and time.      Cranial Nerves: No cranial nerve deficit.      Gait: Gait abnormal.   Psychiatric:         Behavior: Behavior normal.         Thought Content: Thought content normal.         Judgment: Judgment normal.       Physical exam has been reviewed and validated on 07/19/2022 and updated with any changes.   Assessment & Plan   Diagnoses and all orders for this visit:    1. Hospital discharge follow-up (Primary)    2. S/P total knee arthroplasty, right    3. Bilateral pulmonary embolism (HCC)    4. Peptic ulcer disease    5. Anemia due to blood loss  -     Hemoglobin and hematocrit, blood; Future           I reviewed records from her recent hospital admissions.  These are discussed above in the history of present illness.  She will continue with outpatient physical therapy for her right total knee replacement as directed by orthopedics.  She will continue with Eliquis for treatment of the recent pulmonary embolus.  She will continue with Protonix 40 mg daily for treatment of the peptic ulcer disease.  She has not had any further evidence of bleeding.  Her most recent hemoglobin on 6/22/2022 was 8.4.  I will recheck a hemoglobin and hematocrit to make sure this remains stable.    Current outpatient and discharge medications have been reconciled for the patient.  Reviewed by: Trung Collins MD      PHQ-2/PHQ-9 Depression Screening 7/19/2022   Retired PHQ-9 Total Score -   Retired Total Score -   Little Interest or Pleasure in Doing Things 0-->not at all   Feeling Down, Depressed or Hopeless 0-->not at all   PHQ-9: Brief Depression Severity Measure Score 0

## 2022-07-19 NOTE — PATIENT INSTRUCTIONS
Dr. Trung Collins will be leaving the clinic and retiring from clinical practice on 08/31/2022. You will need to establish care with a new primary care provider prior to that date. You should do this as soon as possible so you can schedule your next follow up appointment with a new provider.

## 2022-09-26 DIAGNOSIS — E55.9 VITAMIN D DEFICIENCY: ICD-10-CM

## 2022-09-26 DIAGNOSIS — E78.2 MIXED HYPERLIPIDEMIA: Primary | ICD-10-CM

## 2022-11-01 ENCOUNTER — OFFICE VISIT (OUTPATIENT)
Dept: FAMILY MEDICINE CLINIC | Facility: CLINIC | Age: 77
End: 2022-11-01

## 2022-11-01 VITALS
HEIGHT: 66 IN | DIASTOLIC BLOOD PRESSURE: 74 MMHG | SYSTOLIC BLOOD PRESSURE: 120 MMHG | OXYGEN SATURATION: 96 % | WEIGHT: 195 LBS | HEART RATE: 75 BPM | BODY MASS INDEX: 31.34 KG/M2

## 2022-11-01 DIAGNOSIS — Z78.0 POST-MENOPAUSAL: ICD-10-CM

## 2022-11-01 DIAGNOSIS — F33.42 MAJOR DEPRESSIVE DISORDER, RECURRENT, IN FULL REMISSION: Chronic | ICD-10-CM

## 2022-11-01 DIAGNOSIS — E55.9 VITAMIN D DEFICIENCY: Chronic | ICD-10-CM

## 2022-11-01 DIAGNOSIS — Z76.89 ENCOUNTER TO ESTABLISH CARE: Primary | ICD-10-CM

## 2022-11-01 DIAGNOSIS — Z79.899 HIGH RISK MEDICATION USE: Chronic | ICD-10-CM

## 2022-11-01 DIAGNOSIS — I26.99 BILATERAL PULMONARY EMBOLISM: Chronic | ICD-10-CM

## 2022-11-01 DIAGNOSIS — G62.9 PERIPHERAL POLYNEUROPATHY: Chronic | ICD-10-CM

## 2022-11-01 DIAGNOSIS — E78.2 MIXED HYPERLIPIDEMIA: Chronic | ICD-10-CM

## 2022-11-01 DIAGNOSIS — F41.1 GENERALIZED ANXIETY DISORDER: Chronic | ICD-10-CM

## 2022-11-01 DIAGNOSIS — M15.9 PRIMARY OSTEOARTHRITIS INVOLVING MULTIPLE JOINTS: Chronic | ICD-10-CM

## 2022-11-01 PROCEDURE — 99214 OFFICE O/P EST MOD 30 MIN: CPT | Performed by: NURSE PRACTITIONER

## 2022-11-01 RX ORDER — MELOXICAM 15 MG/1
15 TABLET ORAL DAILY
Qty: 30 TABLET | Refills: 5 | Status: SHIPPED | OUTPATIENT
Start: 2022-11-01

## 2022-11-01 RX ORDER — LORAZEPAM 0.5 MG/1
0.5 TABLET ORAL EVERY 8 HOURS PRN
Qty: 60 TABLET | Refills: 0 | Status: SHIPPED | OUTPATIENT
Start: 2022-11-01 | End: 2023-01-16

## 2022-11-01 RX ORDER — MELOXICAM 15 MG/1
15 TABLET ORAL DAILY
COMMUNITY
End: 2022-11-01 | Stop reason: SDUPTHER

## 2022-11-01 NOTE — PROGRESS NOTES
CC: Establish Care      Subjective:  Eusebia Braden is a 77 y.o. female who presents for         Patient presents to office to establish care today.  Formerly a patient of Dr. Collins.  Patient has history of bilateral pulmonary embolism status post embolectomy.  She is no longer on Eliquis. States was taken off of this just this week. Was advised to stop daily ASA, also.  She has a thoracic aneurysm.  She has hyperlipidemia in which she takes atorvastatin for.  She has peripheral neuropathy in which she takes gabapentin for.  She has generalized anxiety disorder which she takes Ativan 0.5 mg every 8 hours as needed for anxiety.  hasn't been taking this much.  needs a refill.  She has vitamin D deficiency in which she is taking a vitamin D supplement.  She has osteoarthritis causing joint pain and chronic low back pain.  She has depression and anxiety which she takes citalopram for and her depression is in complete remission with the citalopram.    She is due for a BMD and is willing to have this ordered today.      The following portions of the patient's history were reviewed and updated as appropriate: allergies, current medications, past family history, past medical history, past social history, past surgical history and problem list.    Past Medical History:   Diagnosis Date   • Acute hypoxemic respiratory failure due to COVID-19 ([facility]) 5/7/2022   • Allergic    • Back pain    • Dyspnea         • Headache    • History of bone density study 2005   • Hyperlipidemia    • Osteoarthritis    • Peripheral neuropathy          Current Outpatient Medications:   •  acetaminophen (TYLENOL) 500 MG tablet, Take 1,000 mg by mouth Every 8 (Eight) Hours As Needed for Mild Pain ., Disp: , Rfl:   •  aspirin 325 MG tablet, Take 325 mg by mouth 2 (Two) Times a Day., Disp: , Rfl:   •  atorvastatin (LIPITOR) 20 MG tablet, TAKE 1 TABLET BY MOUTH EVERY NIGHT., Disp: 90 tablet, Rfl: 3  •  citalopram (CeleXA) 20 MG tablet,  "Take 1 tablet by mouth Daily., Disp: 90 tablet, Rfl: 3  •  gabapentin (NEURONTIN) 300 MG capsule, 1 CAP EACH MORNING AND AFTERNOON, AND 2 CAPS AT BEDTIME., Disp: 360 capsule, Rfl: 0  •  loratadine (CLARITIN) 10 MG tablet, Take 10 mg by mouth daily., Disp: , Rfl:   •  LORazepam (ATIVAN) 0.5 MG tablet, Take 1 tablet by mouth Every 8 (Eight) Hours As Needed for Anxiety., Disp: 60 tablet, Rfl: 0  •  meloxicam (MOBIC) 15 MG tablet, Take 1 tablet by mouth Daily., Disp: 30 tablet, Rfl: 5  •  pantoprazole (PROTONIX) 40 MG EC tablet, Take 40 mg by mouth Daily., Disp: , Rfl:   •  vitamin D (ERGOCALCIFEROL) 1.25 MG (34721 UT) capsule capsule, TAKE 1 CAPSULE BY MOUTH 1 (ONE) TIME PER WEEK., Disp: 12 capsule, Rfl: 3    Review of Systems    Review of Systems   Constitutional: Negative.    HENT: Negative.    Eyes: Negative.    Respiratory: Negative.    Cardiovascular: Negative.    Gastrointestinal: Negative.    Endocrine: Negative.    Genitourinary: Negative.    Musculoskeletal: Positive for arthralgias and back pain.   Skin: Negative.    Allergic/Immunologic: Negative.    Neurological: Negative.    Hematological: Negative.    Psychiatric/Behavioral: Negative.    All other systems reviewed and are negative.      Objective  Vitals:    11/01/22 1452   BP: 120/74   Pulse: 75   SpO2: 96%   Weight: 88.5 kg (195 lb)   Height: 167.6 cm (66\")     Body mass index is 31.47 kg/m².    Physical Exam    Physical Exam  Vitals and nursing note reviewed.   Constitutional:       General: She is not in acute distress.     Appearance: Normal appearance. She is not ill-appearing, toxic-appearing or diaphoretic.   HENT:      Head: Normocephalic and atraumatic.   Cardiovascular:      Rate and Rhythm: Normal rate and regular rhythm.      Pulses: Normal pulses.      Heart sounds: Normal heart sounds. No murmur heard.    No friction rub. No gallop.   Pulmonary:      Effort: Pulmonary effort is normal. No respiratory distress.      Breath sounds: Normal " breath sounds. No stridor. No wheezing, rhonchi or rales.   Chest:      Chest wall: No tenderness.   Musculoskeletal:      Cervical back: Normal range of motion and neck supple.      Right lower leg: Edema (Trace) present.      Left lower leg: Edema (Trace) present.      Comments: Uses a walker for ambulation   Skin:     General: Skin is warm and dry.      Findings: No rash.   Neurological:      General: No focal deficit present.      Mental Status: She is alert and oriented to person, place, and time. Mental status is at baseline.   Psychiatric:         Mood and Affect: Mood normal.         Behavior: Behavior normal.         Thought Content: Thought content normal.         Judgment: Judgment normal.             Diagnoses and all orders for this visit:    1. Encounter to establish care (Primary)    2. High risk medication use  -     ToxASSURE Select 13 (MW) - Urine, Clean Catch; Future  -     Gabapentin, Urine -; Future    3. Bilateral pulmonary embolism (HCC)  Comments:  H/O    4. Mixed hyperlipidemia    5. Peripheral polyneuropathy    6. Generalized anxiety disorder  -     LORazepam (ATIVAN) 0.5 MG tablet; Take 1 tablet by mouth Every 8 (Eight) Hours As Needed for Anxiety.  Dispense: 60 tablet; Refill: 0    7. Vitamin D deficiency    8. Primary osteoarthritis involving multiple joints  -     meloxicam (MOBIC) 15 MG tablet; Take 1 tablet by mouth Daily.  Dispense: 30 tablet; Refill: 5    9. Major depressive disorder, recurrent, in full remission (HCC)    10. Post-menopausal  -     DEXA Bone Density Axial       Patient to establish care today.  Labs have been previously ordered.  She is to return fasting to have these labs drawn.  We will also do a tox assure and gabapentin level.  Patient to continue her current medications.  Ativan and Mobic were refilled today as requested. Patient understands the risks associated with this controlled medication, including tolerance and addiction.  she also agrees to only obtain  this medication from me, and not from a another provider, unless that provider is covering for me in my absence.  she also agrees to be compliant in dosing, and not self adjust the dose of medication.  A signed controlled substance agreement is on file, and she has received a controlled substance education sheet at this a previous visit.  she has also signed a consent for treatment with a controlled substance as per [facility] policy. KESHAWN was obtained.  Bone density ordered today.  Patient to follow-up in 3 months or sooner if needed for recheck on anxiety and peripheral neuropathy.  Answered all questions.  Patient verbalized understanding of plan of care.          This document has been electronically signed by AFSANEH Stafford on November 1, 2022 15:12 CDT

## 2023-01-13 DIAGNOSIS — F41.1 GENERALIZED ANXIETY DISORDER: Chronic | ICD-10-CM

## 2023-01-16 RX ORDER — LORAZEPAM 0.5 MG/1
0.5 TABLET ORAL EVERY 8 HOURS PRN
Qty: 60 TABLET | Refills: 0 | Status: SHIPPED | OUTPATIENT
Start: 2023-01-16

## 2023-02-06 ENCOUNTER — OFFICE VISIT (OUTPATIENT)
Dept: FAMILY MEDICINE CLINIC | Facility: CLINIC | Age: 78
End: 2023-02-06
Payer: MEDICARE

## 2023-02-06 VITALS
HEART RATE: 81 BPM | BODY MASS INDEX: 26.63 KG/M2 | HEIGHT: 67 IN | SYSTOLIC BLOOD PRESSURE: 116 MMHG | OXYGEN SATURATION: 96 % | DIASTOLIC BLOOD PRESSURE: 70 MMHG

## 2023-02-06 DIAGNOSIS — G62.9 PERIPHERAL POLYNEUROPATHY: Primary | Chronic | ICD-10-CM

## 2023-02-06 DIAGNOSIS — F41.1 GENERALIZED ANXIETY DISORDER: Chronic | ICD-10-CM

## 2023-02-06 DIAGNOSIS — E55.9 VITAMIN D DEFICIENCY: ICD-10-CM

## 2023-02-06 DIAGNOSIS — R60.0 BILATERAL LOWER EXTREMITY EDEMA: Chronic | ICD-10-CM

## 2023-02-06 PROCEDURE — 99214 OFFICE O/P EST MOD 30 MIN: CPT | Performed by: NURSE PRACTITIONER

## 2023-02-06 RX ORDER — FUROSEMIDE 20 MG/1
20 TABLET ORAL DAILY PRN
Qty: 30 TABLET | Refills: 5 | Status: SHIPPED | OUTPATIENT
Start: 2023-02-06

## 2023-02-06 RX ORDER — POTASSIUM CHLORIDE 1500 MG/1
20 TABLET, FILM COATED, EXTENDED RELEASE ORAL DAILY
Qty: 30 TABLET | Refills: 5 | Status: SHIPPED | OUTPATIENT
Start: 2023-02-06 | End: 2023-02-06

## 2023-02-06 RX ORDER — POTASSIUM CHLORIDE 750 MG/1
10 TABLET, FILM COATED, EXTENDED RELEASE ORAL 2 TIMES DAILY
Qty: 60 TABLET | Refills: 5 | Status: SHIPPED | OUTPATIENT
Start: 2023-02-06

## 2023-02-06 NOTE — PROGRESS NOTES
CC: Follow-up (3 month FU) and swelling (Bilateral feet swelling, mainly left )      Subjective:  Eusebia Braden is a 77 y.o. female who presents for         Patient presents to office for 3-month follow-up on her peripheral neuropathy and anxiety.  She takes gabapentin for her neuropathy.  This does help with her symptoms.  She takes Ativan as needed for her anxiety.  This helps with her anxiety.  She does not need a dose increase today.    She does complain of bilateral feet swelling.  Onset about 2 weeks ago. States it is worse when she is up on her feet. Sometimes she has pain with the swelling. They are sore to the touch. States wears compression stockings sometimes.      The following portions of the patient's history were reviewed and updated as appropriate: allergies, current medications, past family history, past medical history, past social history, past surgical history and problem list.    Past Medical History:   Diagnosis Date   • Acute hypoxemic respiratory failure due to COVID-19 (HCC) 5/7/2022   • Allergic    • Back pain    • Dyspnea         • Headache    • History of bone density study 2005   • Hyperlipidemia    • Osteoarthritis    • Peripheral neuropathy          Current Outpatient Medications:   •  acetaminophen (TYLENOL) 500 MG tablet, Take 1,000 mg by mouth Every 8 (Eight) Hours As Needed for Mild Pain ., Disp: , Rfl:   •  aspirin 325 MG tablet, Take 325 mg by mouth 2 (Two) Times a Day., Disp: , Rfl:   •  atorvastatin (LIPITOR) 20 MG tablet, TAKE 1 TABLET BY MOUTH EVERY NIGHT., Disp: 90 tablet, Rfl: 3  •  citalopram (CeleXA) 20 MG tablet, Take 1 tablet by mouth Daily., Disp: 90 tablet, Rfl: 3  •  gabapentin (NEURONTIN) 300 MG capsule, 1 CAP EACH MORNING AND AFTERNOON, AND 2 CAPS AT BEDTIME., Disp: 360 capsule, Rfl: 0  •  loratadine (CLARITIN) 10 MG tablet, Take 10 mg by mouth daily., Disp: , Rfl:   •  LORazepam (ATIVAN) 0.5 MG tablet, TAKE 1 TABLET BY MOUTH EVERY 8 (EIGHT) HOURS AS NEEDED FOR  "ANXIETY., Disp: 60 tablet, Rfl: 0  •  meloxicam (MOBIC) 15 MG tablet, Take 1 tablet by mouth Daily., Disp: 30 tablet, Rfl: 5  •  pantoprazole (PROTONIX) 40 MG EC tablet, Take 40 mg by mouth Daily., Disp: , Rfl:   •  vitamin D (ERGOCALCIFEROL) 1.25 MG (95180 UT) capsule capsule, TAKE 1 CAPSULE BY MOUTH 1 (ONE) TIME PER WEEK., Disp: 12 capsule, Rfl: 3  •  furosemide (Lasix) 20 MG tablet, Take 1 tablet by mouth Daily As Needed (Swelling)., Disp: 30 tablet, Rfl: 5  •  potassium chloride (K-DUR,KLOR-CON) 10 MEQ ER tablet, Take 1 tablet by mouth 2 (Two) Times a Day., Disp: 60 tablet, Rfl: 5    Review of Systems    Review of Systems   Constitutional: Negative.    HENT: Negative.    Eyes: Negative.    Respiratory: Negative.    Cardiovascular: Positive for leg swelling.   Gastrointestinal: Negative.    Endocrine: Negative.    Genitourinary: Negative.    Musculoskeletal: Negative.    Skin: Negative.    Allergic/Immunologic: Negative.    Neurological: Negative.    Hematological: Negative.    Psychiatric/Behavioral: Negative.    All other systems reviewed and are negative.      Objective  Vitals:    02/06/23 0917   BP: 116/70   Pulse: 81   SpO2: 96%   Height: 170.2 cm (67\")     Body mass index is 26.63 kg/m².    Physical Exam    Physical Exam  Vitals and nursing note reviewed.   Constitutional:       General: She is not in acute distress.     Appearance: Normal appearance. She is not ill-appearing, toxic-appearing or diaphoretic.   HENT:      Head: Normocephalic and atraumatic.   Cardiovascular:      Rate and Rhythm: Normal rate and regular rhythm.      Pulses: Normal pulses.      Heart sounds: Normal heart sounds. No murmur heard.    No friction rub. No gallop.   Pulmonary:      Effort: Pulmonary effort is normal. No respiratory distress.      Breath sounds: Normal breath sounds. No stridor. No wheezing, rhonchi or rales.   Chest:      Chest wall: No tenderness.   Musculoskeletal:      Cervical back: Normal range of motion and " neck supple.      Right lower leg: Edema present.      Left lower leg: Edema present.      Comments: 1+ pitting edema noted to bilateral lower extremities   Skin:     General: Skin is warm and dry.      Findings: No rash.   Neurological:      Mental Status: She is alert.             Diagnoses and all orders for this visit:    1. Peripheral polyneuropathy (Primary)    2. Generalized anxiety disorder    3. Bilateral lower extremity edema  -     furosemide (Lasix) 20 MG tablet; Take 1 tablet by mouth Daily As Needed (Swelling).  Dispense: 30 tablet; Refill: 5  -     Discontinue: potassium chloride (K-DUR,KLOR-CON) 20 MEQ tablet controlled-release ER tablet; Take 1 tablet by mouth Daily.  Dispense: 30 tablet; Refill: 5    Other orders  -     potassium chloride (K-DUR,KLOR-CON) 10 MEQ ER tablet; Take 1 tablet by mouth 2 (Two) Times a Day.  Dispense: 60 tablet; Refill: 5       Patient in for follow-up on her peripheral neuropathy and POOJA.  She is to continue the Ativan and gabapentin as needed for her symptoms.  She does not need refills on these medications today.  She does have bilateral lower extremity edema.  She is advised to obtain compression stockings and to apply these in the morning and wear them till bedtime.  She is advised to keep her feet elevated is much as possible.  She is given Lasix 20 mg to take daily as needed and potassium to take twice daily while taking the Lasix.  She is instructed on how to take these medications and possible side effects.  She is to follow-up in 3 months or sooner if needed for recheck on her peripheral neuropathy and Ativan.  Answered all questions.  Patient verbalized understanding of plan of care.    Patient understands the risks associated with this controlled medication, including tolerance and addiction.  she also agrees to only obtain this medication from me, and not from a another provider, unless that provider is covering for me in my absence.  she also agrees to be  compliant in dosing, and not self adjust the dose of medication.  A signed controlled substance agreement is on file, and she has received a controlled substance education sheet at this a previous visit.  she has also signed a consent for treatment with a controlled substance as per [facility] policy. KESHAWN was obtained.            This document has been electronically signed by AFSANEH Stafford on February 6, 2023 13:54 CST

## 2023-02-07 DIAGNOSIS — E55.9 VITAMIN D DEFICIENCY: ICD-10-CM

## 2023-02-07 RX ORDER — ERGOCALCIFEROL 1.25 MG/1
50000 CAPSULE ORAL WEEKLY
Qty: 12 CAPSULE | Refills: 3 | OUTPATIENT
Start: 2023-02-07

## 2023-02-07 RX ORDER — ERGOCALCIFEROL 1.25 MG/1
50000 CAPSULE ORAL WEEKLY
Qty: 12 CAPSULE | Refills: 3 | Status: SHIPPED | OUTPATIENT
Start: 2023-02-07

## 2023-02-07 NOTE — TELEPHONE ENCOUNTER
Medication refill for vitamin d came to Dr Collins. I denied the request and sent a refill request to Kylie Aguilar. Send to [facility].

## 2023-02-10 ENCOUNTER — TELEPHONE (OUTPATIENT)
Dept: FAMILY MEDICINE CLINIC | Facility: CLINIC | Age: 78
End: 2023-02-10
Payer: MEDICARE

## 2023-02-10 NOTE — TELEPHONE ENCOUNTER
Called pt to remind her she is due for fasting labs. States she will come in next week and have these drawn. Lab orders extended.

## 2023-02-14 ENCOUNTER — LAB (OUTPATIENT)
Dept: LAB | Facility: OTHER | Age: 78
End: 2023-02-14
Payer: MEDICARE

## 2023-02-14 DIAGNOSIS — Z79.899 HIGH RISK MEDICATION USE: Chronic | ICD-10-CM

## 2023-02-14 DIAGNOSIS — E55.9 VITAMIN D DEFICIENCY: ICD-10-CM

## 2023-02-14 DIAGNOSIS — E78.2 MIXED HYPERLIPIDEMIA: ICD-10-CM

## 2023-02-14 LAB
25(OH)D3 SERPL-MCNC: 77.2 NG/ML (ref 30–100)
ALBUMIN SERPL-MCNC: 4.3 G/DL (ref 3.5–5)
ALBUMIN/GLOB SERPL: 1.3 G/DL (ref 1.1–1.8)
ALP SERPL-CCNC: 88 U/L (ref 38–126)
ALT SERPL W P-5'-P-CCNC: 18 U/L
ANION GAP SERPL CALCULATED.3IONS-SCNC: 4 MMOL/L (ref 5–15)
ARTICHOKE IGE QN: 120 MG/DL (ref 0–100)
AST SERPL-CCNC: 24 U/L (ref 14–36)
BACTERIA UR QL AUTO: ABNORMAL /HPF
BASOPHILS # BLD AUTO: 0.11 10*3/MM3 (ref 0–0.2)
BASOPHILS NFR BLD AUTO: 1.6 % (ref 0–1.5)
BILIRUB SERPL-MCNC: 0.6 MG/DL (ref 0.2–1.3)
BILIRUB UR QL STRIP: NEGATIVE
BUN SERPL-MCNC: 19 MG/DL (ref 7–23)
BUN/CREAT SERPL: 18.6 (ref 7–25)
CALCIUM SPEC-SCNC: 9.3 MG/DL (ref 8.4–10.2)
CHLORIDE SERPL-SCNC: 104 MMOL/L (ref 101–112)
CLARITY UR: ABNORMAL
CO2 SERPL-SCNC: 31 MMOL/L (ref 22–30)
COLOR UR: YELLOW
CREAT SERPL-MCNC: 1.02 MG/DL (ref 0.52–1.04)
DEPRECATED RDW RBC AUTO: 41.8 FL (ref 37–54)
EGFRCR SERPLBLD CKD-EPI 2021: 56.1 ML/MIN/1.73
EOSINOPHIL # BLD AUTO: 0.15 10*3/MM3 (ref 0–0.4)
EOSINOPHIL NFR BLD AUTO: 2.2 % (ref 0.3–6.2)
ERYTHROCYTE [DISTWIDTH] IN BLOOD BY AUTOMATED COUNT: 14.5 % (ref 12.3–15.4)
GLOBULIN UR ELPH-MCNC: 3.2 GM/DL (ref 2.3–3.5)
GLUCOSE SERPL-MCNC: 100 MG/DL (ref 70–99)
GLUCOSE UR STRIP-MCNC: NEGATIVE MG/DL
HCT VFR BLD AUTO: 38.5 % (ref 34–46.6)
HGB BLD-MCNC: 11.8 G/DL (ref 12–15.9)
HGB UR QL STRIP.AUTO: ABNORMAL
HYALINE CASTS UR QL AUTO: ABNORMAL /LPF
KETONES UR QL STRIP: NEGATIVE
LEUKOCYTE ESTERASE UR QL STRIP.AUTO: ABNORMAL
LYMPHOCYTES # BLD AUTO: 1.79 10*3/MM3 (ref 0.7–3.1)
LYMPHOCYTES NFR BLD AUTO: 26.7 % (ref 19.6–45.3)
MCH RBC QN AUTO: 24.7 PG (ref 26.6–33)
MCHC RBC AUTO-ENTMCNC: 30.6 G/DL (ref 31.5–35.7)
MCV RBC AUTO: 80.5 FL (ref 79–97)
MONOCYTES # BLD AUTO: 0.62 10*3/MM3 (ref 0.1–0.9)
MONOCYTES NFR BLD AUTO: 9.2 % (ref 5–12)
NEUTROPHILS NFR BLD AUTO: 4.04 10*3/MM3 (ref 1.7–7)
NEUTROPHILS NFR BLD AUTO: 60.3 % (ref 42.7–76)
NITRITE UR QL STRIP: POSITIVE
PH UR STRIP.AUTO: 6 [PH] (ref 5.5–8)
PLATELET # BLD AUTO: 228 10*3/MM3 (ref 140–450)
PMV BLD AUTO: 11.1 FL (ref 6–12)
POTASSIUM SERPL-SCNC: 4.4 MMOL/L (ref 3.4–5)
PROT SERPL-MCNC: 7.5 G/DL (ref 6.3–8.6)
PROT UR QL STRIP: NEGATIVE
RBC # BLD AUTO: 4.78 10*6/MM3 (ref 3.77–5.28)
RBC # UR STRIP: ABNORMAL /HPF
REF LAB TEST METHOD: ABNORMAL
SODIUM SERPL-SCNC: 139 MMOL/L (ref 137–145)
SP GR UR STRIP: 1.02 (ref 1–1.03)
SQUAMOUS #/AREA URNS HPF: ABNORMAL /HPF
T4 FREE SERPL-MCNC: 1.11 NG/DL (ref 0.93–1.7)
TSH SERPL DL<=0.05 MIU/L-ACNC: 1.47 UIU/ML (ref 0.27–4.2)
UROBILINOGEN UR QL STRIP: ABNORMAL
WBC # UR STRIP: ABNORMAL /HPF
WBC NRBC COR # BLD: 6.71 10*3/MM3 (ref 3.4–10.8)

## 2023-02-14 PROCEDURE — 85025 COMPLETE CBC W/AUTO DIFF WBC: CPT | Performed by: NURSE PRACTITIONER

## 2023-02-14 PROCEDURE — 84439 ASSAY OF FREE THYROXINE: CPT | Performed by: NURSE PRACTITIONER

## 2023-02-14 PROCEDURE — 82306 VITAMIN D 25 HYDROXY: CPT | Performed by: NURSE PRACTITIONER

## 2023-02-14 PROCEDURE — 83721 ASSAY OF BLOOD LIPOPROTEIN: CPT | Performed by: NURSE PRACTITIONER

## 2023-02-14 PROCEDURE — 87077 CULTURE AEROBIC IDENTIFY: CPT | Performed by: NURSE PRACTITIONER

## 2023-02-14 PROCEDURE — 81001 URINALYSIS AUTO W/SCOPE: CPT | Performed by: NURSE PRACTITIONER

## 2023-02-14 PROCEDURE — 80053 COMPREHEN METABOLIC PANEL: CPT | Performed by: NURSE PRACTITIONER

## 2023-02-14 PROCEDURE — 80307 DRUG TEST PRSMV CHEM ANLYZR: CPT | Performed by: NURSE PRACTITIONER

## 2023-02-14 PROCEDURE — 84443 ASSAY THYROID STIM HORMONE: CPT | Performed by: NURSE PRACTITIONER

## 2023-02-14 PROCEDURE — 87086 URINE CULTURE/COLONY COUNT: CPT | Performed by: NURSE PRACTITIONER

## 2023-02-14 PROCEDURE — 87186 SC STD MICRODIL/AGAR DIL: CPT | Performed by: NURSE PRACTITIONER

## 2023-02-14 PROCEDURE — G0481 DRUG TEST DEF 8-14 CLASSES: HCPCS | Performed by: NURSE PRACTITIONER

## 2023-02-14 PROCEDURE — 36415 COLL VENOUS BLD VENIPUNCTURE: CPT | Performed by: NURSE PRACTITIONER

## 2023-02-16 ENCOUNTER — TELEPHONE (OUTPATIENT)
Dept: FAMILY MEDICINE CLINIC | Facility: CLINIC | Age: 78
End: 2023-02-16
Payer: MEDICARE

## 2023-02-16 RX ORDER — NITROFURANTOIN 25; 75 MG/1; MG/1
100 CAPSULE ORAL 2 TIMES DAILY
Qty: 14 CAPSULE | Refills: 0 | Status: SHIPPED | OUTPATIENT
Start: 2023-02-16 | End: 2023-02-23

## 2023-02-16 NOTE — TELEPHONE ENCOUNTER
----- Message from AFSANEH Bazzi sent at 2/16/2023 12:15 PM CST -----  Patient's LDL cholesterol is elevated at 120.  Please advise a low-fat low-cholesterol diet and exercise routine to help lower number.  CMP is okay.  Does show stage III chronic kidney disease.  If this worsens, we will need to refer patient to nephrology for further evaluation.  Urinalysis did show some white blood cells, but it looked to be colonization of organisms in her urinary stream.  Please find out if patient is having any symptoms.  Will treat if she is having symptoms.  Just let me know.  CBC is okay.  Hemoglobin and hematocrit has improved since 7 months ago.  Thyroid panel is normal.  Vitamin D level is normal.  Please inform patient of results.

## 2023-02-16 NOTE — TELEPHONE ENCOUNTER
Kylie Aguilar APRN Cour[place], [place] M, MA  Caller: Unspecified (Today,  3:04 PM)  I have sent in Macrobid to be taken twice daily for 7 days for urinary symptoms. Please inform ptMee Espinal       Patient advised.

## 2023-02-16 NOTE — TELEPHONE ENCOUNTER
I read AFSANEH Villafuerte message to the patient. The patient voiced understanding, had no additional questions, and thanked me for calling.   She is experiencing frequent urination and sensation of not being able to fully empty the bladder. No pain. Send medication to [facility].

## 2023-02-20 LAB — GABAPENTIN UR-MCNC: >800 UG/ML

## 2023-02-22 LAB — DRUGS UR: NORMAL

## 2023-02-22 RX ORDER — SULFAMETHOXAZOLE AND TRIMETHOPRIM 800; 160 MG/1; MG/1
1 TABLET ORAL 2 TIMES DAILY
Qty: 10 TABLET | Refills: 0 | Status: SHIPPED | OUTPATIENT
Start: 2023-02-22

## 2023-03-13 LAB — BACTERIA SPEC AEROBE CULT: ABNORMAL

## 2023-04-10 DIAGNOSIS — F41.1 GENERALIZED ANXIETY DISORDER: Chronic | ICD-10-CM

## 2023-04-10 RX ORDER — CITALOPRAM 20 MG/1
20 TABLET ORAL DAILY
Qty: 30 TABLET | Refills: 3 | OUTPATIENT
Start: 2023-04-10

## 2023-04-10 RX ORDER — LORAZEPAM 0.5 MG/1
0.5 TABLET ORAL EVERY 8 HOURS PRN
Qty: 60 TABLET | Refills: 0 | Status: SHIPPED | OUTPATIENT
Start: 2023-04-10

## 2023-04-11 DIAGNOSIS — F41.1 GENERALIZED ANXIETY DISORDER: Chronic | ICD-10-CM

## 2023-04-12 ENCOUNTER — PRIOR AUTHORIZATION (OUTPATIENT)
Dept: FAMILY MEDICINE CLINIC | Facility: CLINIC | Age: 78
End: 2023-04-12
Payer: MEDICARE

## 2023-04-12 RX ORDER — CITALOPRAM 20 MG/1
20 TABLET ORAL DAILY
Qty: 30 TABLET | Refills: 3 | Status: SHIPPED | OUTPATIENT
Start: 2023-04-12

## 2023-04-12 NOTE — TELEPHONE ENCOUNTER
Prior authorization submitted 4/12/2023 for Lorazepam 0.5 MG.     KEY [id number]      N.O. 4/12/2023 [id number]

## 2023-04-13 NOTE — TELEPHONE ENCOUNTER
Prior authorization approved 4/12/2023.     PA Case ID: [id number]   Rx #: [id number]  Coverage Start Date 03/13/2023  Coverage End Date 04/11/2024

## 2023-05-08 ENCOUNTER — OFFICE VISIT (OUTPATIENT)
Dept: FAMILY MEDICINE CLINIC | Facility: CLINIC | Age: 78
End: 2023-05-08
Payer: MEDICARE

## 2023-05-08 VITALS
HEART RATE: 88 BPM | HEIGHT: 67 IN | OXYGEN SATURATION: 95 % | SYSTOLIC BLOOD PRESSURE: 120 MMHG | WEIGHT: 204 LBS | DIASTOLIC BLOOD PRESSURE: 74 MMHG | BODY MASS INDEX: 32.02 KG/M2

## 2023-05-08 DIAGNOSIS — Z79.899 HIGH RISK MEDICATION USE: Chronic | ICD-10-CM

## 2023-05-08 DIAGNOSIS — E78.2 MIXED HYPERLIPIDEMIA: Chronic | ICD-10-CM

## 2023-05-08 DIAGNOSIS — E66.9 OBESITY WITH BODY MASS INDEX (BMI) OF 30.0 TO 39.9: Chronic | ICD-10-CM

## 2023-05-08 DIAGNOSIS — M15.9 PRIMARY OSTEOARTHRITIS INVOLVING MULTIPLE JOINTS: Chronic | ICD-10-CM

## 2023-05-08 DIAGNOSIS — G62.9 PERIPHERAL POLYNEUROPATHY: Chronic | ICD-10-CM

## 2023-05-08 DIAGNOSIS — F33.42 MAJOR DEPRESSIVE DISORDER, RECURRENT, IN FULL REMISSION: Chronic | ICD-10-CM

## 2023-05-08 DIAGNOSIS — Z00.00 MEDICARE ANNUAL WELLNESS VISIT, SUBSEQUENT: Primary | ICD-10-CM

## 2023-05-08 DIAGNOSIS — F41.1 GENERALIZED ANXIETY DISORDER: Chronic | ICD-10-CM

## 2023-05-08 DIAGNOSIS — R60.0 BILATERAL LOWER EXTREMITY EDEMA: Chronic | ICD-10-CM

## 2023-05-08 DIAGNOSIS — I71.20 THORACIC AORTIC ANEURYSM WITHOUT RUPTURE, UNSPECIFIED PART: Chronic | ICD-10-CM

## 2023-05-08 DIAGNOSIS — I26.99 BILATERAL PULMONARY EMBOLISM: Chronic | ICD-10-CM

## 2023-05-08 DIAGNOSIS — Z91.81 AT MODERATE RISK FOR FALL: Chronic | ICD-10-CM

## 2023-05-08 DIAGNOSIS — E55.9 VITAMIN D DEFICIENCY: Chronic | ICD-10-CM

## 2023-05-08 PROBLEM — A41.9 SEPSIS: Status: RESOLVED | Noted: 2022-05-07 | Resolved: 2023-05-08

## 2023-05-08 NOTE — PATIENT INSTRUCTIONS
Fall Prevention in the Home, Adult  Falls can cause injuries and affect people of all ages. There are many simple things that you can do to make your home safe and to help prevent falls. Ask for help when making these changes, if needed.  What actions can I take to prevent falls?  General instructions  • Use good lighting in all rooms. Replace any light bulbs that burn out, turn on lights if it is dark, and use night-lights.  • Place frequently used items in easy-to-reach places. Lower the shelves around your home if necessary.  • Set up furniture so that there are clear paths around it. Avoid moving your furniture around.  • Remove throw rugs and other tripping hazards from the floor.  • Avoid walking on wet floors.  • Fix any uneven floor surfaces.  • Add color or contrast paint or tape to grab bars and handrails in your home. Place contrasting color strips on the first and last steps of staircases.  • When you use a stepladder, make sure that it is completely opened and that the sides and supports are firmly locked. Have someone hold the ladder while you are using it. Do not climb a closed stepladder.  • Know where your pets are when moving through your home.  What can I do in the bathroom?     • Keep the floor dry. Immediately clean up any water that is on the floor.  • Remove soap buildup in the tub or shower regularly.  • Use nonskid mats or decals on the floor of the tub or shower.  • Attach bath mats securely with double-sided, nonslip rug tape.  • If you need to sit down while you are in the shower, use a plastic, nonslip stool.  • Install grab bars by the toilet and in the tub and shower. Do not use towel bars as grab bars.  What can I do in the bedroom?  • Make sure that a bedside light is easy to reach.  • Do not use oversized bedding that reaches the floor.  • Have a firm chair that has side arms to use for getting dressed.  What can I do in the kitchen?  • Clean up any spills right away.  • If you  need to reach for something above you, use a sturdy step stool that has a grab bar.  • Keep electrical cables out of the way.  • Do not use floor polish or wax that makes floors slippery. If you must use wax, make sure that it is non-skid floor wax.  What can I do with my stairs?  • Do not leave any items on the stairs.  • Make sure that you have a light switch at the top and the bottom of the stairs. Have them installed if you do not have them.  • Make sure that there are handrails on both sides of the stairs. Fix handrails that are broken or loose. Make sure that handrails are as long as the staircases.  • Install non-slip stair treads on all stairs in your home.  • Avoid having throw rugs at the top or bottom of stairs, or secure the rugs with carpet tape to prevent them from moving.  • Choose a carpet design that does not hide the edge of steps on the stairs.  • Check any carpeting to make sure that it is firmly attached to the stairs. Fix any carpet that is loose or worn.  What can I do on the outside of my home?  • Use bright outdoor lighting.  • Regularly repair the edges of walkways and driveways and fix any cracks.  • Remove high doorway thresholds.  • Trim any shrubbery on the main path into your home.  • Regularly check that handrails are securely fastened and in good repair. Both sides of all steps should have handrails.  • Install guardrails along the edges of any raised decks or porches.  • Clear walkways of debris and clutter, including tools and rocks.  • Have leaves, snow, and ice cleared regularly.  • Use sand or salt on walkways during winter months.  • In the garage, clean up any spills right away, including grease or oil spills.  What other actions can I take?  • Wear closed-toe shoes that fit well and support your feet. Wear shoes that have rubber soles or low heels.  • Use mobility aids as needed, such as canes, walkers, scooters, and crutches.  • Review your medicines with your health care  provider. Some medicines can cause dizziness or changes in blood pressure, which increase your risk of falling.  Talk with your health care provider about other ways that you can decrease your risk of falls. This may include working with a physical therapist or  to improve your strength, balance, and endurance.  Where to find more information  • Centers for Disease Control and Prevention, STEADI: www.cdc.gov  • National [place] on Aging: www.bob.nih.gov  Contact a health care provider if:  • You are afraid of falling at home.  • You feel weak, drowsy, or dizzy at home.  • You fall at home.  Summary  • There are many simple things that you can do to make your home safe and to help prevent falls.  • Ways to make your home safe include removing tripping hazards and installing grab bars in the bathroom.  • Ask for help when making these changes in your home.  This information is not intended to replace advice given to you by your health care provider. Make sure you discuss any questions you have with your health care provider.  Document Revised: 09/19/2022 Document Reviewed: 07/21/2021  Elsevier Patient Education © 2022 [employer]

## 2023-05-08 NOTE — PROGRESS NOTES
The ABCs of the Annual Wellness Visit  Subsequent Medicare Wellness Visit    Subjective        Eusebia Braden is a 77 y.o. female who presents for a Subsequent Medicare Wellness Visit.    The following portions of the patient's history were reviewed and   updated as appropriate: allergies, current medications, past family history, past medical history, past social history, past surgical history and problem list.    Compared to one year ago, the patient feels her physical   health is better.    Compared to one year ago, the patient feels her mental   health is the same.    Recent Hospitalizations:  She was not admitted to the hospital during the last year.       Current Medical Providers:  Patient Care Team:  Kylie Aguilar APRN as PCP - General (Family Medicine)  Adalberto Sparks MD as Surgeon (Orthopedic Surgery)    Outpatient Medications Prior to Visit   Medication Sig Dispense Refill   • acetaminophen (TYLENOL) 500 MG tablet Take 2 tablets by mouth Every 8 (Eight) Hours As Needed for Mild Pain.     • aspirin 325 MG tablet Take 1 tablet by mouth 2 (Two) Times a Day.     • atorvastatin (LIPITOR) 20 MG tablet TAKE 1 TABLET BY MOUTH EVERY NIGHT. 90 tablet 3   • citalopram (CeleXA) 20 MG tablet TAKE 1 TABLET BY MOUTH DAILY. 30 tablet 3   • furosemide (Lasix) 20 MG tablet Take 1 tablet by mouth Daily As Needed (Swelling). 30 tablet 5   • gabapentin (NEURONTIN) 300 MG capsule 1 CAP EACH MORNING AND AFTERNOON, AND 2 CAPS AT BEDTIME. 360 capsule 0   • loratadine (CLARITIN) 10 MG tablet Take 1 tablet by mouth Daily.     • LORazepam (ATIVAN) 0.5 MG tablet TAKE 1 TABLET BY MOUTH EVERY 8 (EIGHT) HOURS AS NEEDED FOR ANXIETY. 60 tablet 0   • meloxicam (MOBIC) 15 MG tablet Take 1 tablet by mouth Daily. 30 tablet 5   • pantoprazole (PROTONIX) 40 MG EC tablet Take 1 tablet by mouth Daily.     • potassium chloride (K-DUR,KLOR-CON) 10 MEQ ER tablet Take 1 tablet by mouth 2 (Two) Times a Day. 60 tablet 5   •  "sulfamethoxazole-trimethoprim (Bactrim DS) 800-160 MG per tablet Take 1 tablet by mouth 2 (Two) Times a Day. 10 tablet 0   • vitamin D (ERGOCALCIFEROL) 1.25 MG (47432 UT) capsule capsule Take 1 capsule by mouth 1 (One) Time Per Week. 12 capsule 3     No facility-administered medications prior to visit.       No opioid medication identified on active medication list. I have reviewed chart for other potential  high risk medication/s and harmful drug interactions in the elderly.          Aspirin is on active medication list. Aspirin use is contraindicated for this patient due to: previous side effects, i.e. bruising, etc.  Patient has been instructed to discontinue this medication. .      Patient Active Problem List   Diagnosis   • Peripheral polyneuropathy   • Primary osteoarthritis involving multiple joints   • Mixed hyperlipidemia   • Chronic nonseasonal allergic rhinitis due to pollen   • Generalized anxiety disorder   • Vitamin D deficiency   • Major depressive disorder, recurrent, in full remission   • Sepsis   • S/P total knee arthroplasty, right   • Bilateral pulmonary embolism   • High risk medication use   • Bilateral lower extremity edema     Advance Care Planning   Advance Care Planning     Advance Directive is not on file.  ACP discussion was held with the patient during this visit. Patient does not have an advance directive, declines further assistance.     Objective    Vitals:    05/08/23 1003   BP: 120/74   Pulse: 88   SpO2: 95%   Weight: 92.5 kg (204 lb)   Height: 170.2 cm (67\")   PainSc:   5   PainLoc: Leg  Comment: bilateral     Estimated body mass index is 31.95 kg/m² as calculated from the following:    Height as of this encounter: 170.2 cm (67\").    Weight as of this encounter: 92.5 kg (204 lb).    BMI is >= 30 and <35. (Class 1 Obesity). The following options were offered after discussion;: exercise counseling/recommendations and nutrition counseling/recommendations      Does the patient have " evidence of cognitive impairment?   No    Lab Results   Component Value Date     (H) 2023          HEALTH RISK ASSESSMENT    Smoking Status:  Social History     Tobacco Use   Smoking Status Former   • Packs/day: 1.00   • Years: 20.00   • Pack years: 20.00   • Types: Cigarettes   • Quit date: 2011   • Years since quittin.7   Smokeless Tobacco Never     Alcohol Consumption:  Social History     Substance and Sexual Activity   Alcohol Use No     Fall Risk Screen:    STEADI Fall Risk Assessment was completed, and patient is at MODERATE risk for falls. Assessment completed on:2023    Depression Screenin/8/2023    10:07 AM   PHQ-2/PHQ-9 Depression Screening   Little Interest or Pleasure in Doing Things 0-->not at all   Feeling Down, Depressed or Hopeless 1-->several days   PHQ-9: Brief Depression Severity Measure Score 1       Health Habits and Functional and Cognitive Screenin/8/2023    10:04 AM   Functional & Cognitive Status   Do you have difficulty preparing food and eating? No   Do you have difficulty bathing yourself, getting dressed or grooming yourself? No   Do you have difficulty using the toilet? No   Do you have difficulty moving around from place to place? Yes   Do you have trouble with steps or getting out of a bed or a chair? Yes   Current Diet Limited Junk Food   Dental Exam Not up to date   Eye Exam Not up to date   Exercise (times per week) 3 times per week   Current Exercises Include Walking   Do you need help using the phone?  No   Are you deaf or do you have serious difficulty hearing?  No   Do you need help with transportation? Yes   Do you need help shopping? No   Do you need help preparing meals?  No   Do you need help with housework?  No   Do you need help with laundry? No   Do you need help taking your medications? No   Do you need help managing money? No   Do you ever drive or ride in a car without wearing a seat belt? No   Have you felt unusual stress,  anger or loneliness in the last month? Yes   Who do you live with? Spouse   If you need help, do you have trouble finding someone available to you? No   Have you been bothered in the last four weeks by sexual problems? No   Do you have difficulty concentrating, remembering or making decisions? Yes       Age-appropriate Screening Schedule:  Refer to the list below for future screening recommendations based on patient's age, sex and/or medical conditions. Orders for these recommended tests are listed in the plan section. The patient has been provided with a written plan.    Health Maintenance   Topic Date Due   • HEPATITIS C SCREENING  Never done   • ZOSTER VACCINE (2 of 2) 07/26/2017   • COVID-19 Vaccine (3 - Booster for Moderna series) 07/06/2021   • ANNUAL WELLNESS VISIT  03/29/2023   • MAMMOGRAM  06/21/2023   • INFLUENZA VACCINE  08/01/2023   • LIPID PANEL  02/14/2024   • DXA SCAN  11/09/2024   • COLORECTAL CANCER SCREENING  08/30/2026   • TDAP/TD VACCINES (4 - Td or Tdap) 06/22/2032   • Pneumococcal Vaccine 65+  Completed                  CMS Preventative Services Quick Reference  Risk Factors Identified During Encounter:    Immunizations Discussed/Encouraged: Shingrix    The above risks/problems have been discussed with the patient.  Pertinent information has been shared with the patient in the After Visit Summary.    Diagnoses and all orders for this visit:    1. Medicare annual wellness visit, subsequent (Primary)        Follow Up:   Next Medicare Wellness visit to be scheduled in 1 year.      An After Visit Summary and PPPS were made available to the patient.

## 2023-05-08 NOTE — PROGRESS NOTES
CC: Follow-up (3 month f/u ) and Medicare Wellness-subsequent      Subjective:  Eusebia Braden is a 77 y.o. female who presents for         Patient presents to office for annual Medicare wellness visit and labs.  She has a history of bilateral pulmonary embolism status post embolectomy.  She has been taken off of Eliquis per hematology and she is also advised to stop aspirin.  She has a thoracic aneurysm.  She has hyperlipidemia in which she takes atorvastatin for.  She has peripheral neuropathy in which she takes gabapentin for.  She has anxiety and depression.  Her symptoms are well controlled and depression is in remission with Ativan and Celexa.  She has vitamin D deficiency.  She currently takes vitamin D replacement therapy.  She has osteoarthritis causing joint and chronic low back pain.  She takes Mobic for this.    She is due for Shingrix No. 2 and COVID #3 immunizations today. She declines update in shingrix today.      The following portions of the patient's history were reviewed and updated as appropriate: allergies, current medications, past family history, past medical history, past social history, past surgical history and problem list.    Past Medical History:   Diagnosis Date   • Acute hypoxemic respiratory failure due to COVID-19 5/7/2022   • Allergic    • Back pain    • Dyspnea         • Headache    • History of bone density study 2005   • Hyperlipidemia    • Osteoarthritis    • Peripheral neuropathy          Current Outpatient Medications:   •  acetaminophen (TYLENOL) 500 MG tablet, Take 2 tablets by mouth Every 8 (Eight) Hours As Needed for Mild Pain., Disp: , Rfl:   •  aspirin 325 MG tablet, Take 1 tablet by mouth 2 (Two) Times a Day., Disp: , Rfl:   •  atorvastatin (LIPITOR) 20 MG tablet, TAKE 1 TABLET BY MOUTH EVERY NIGHT., Disp: 90 tablet, Rfl: 3  •  citalopram (CeleXA) 20 MG tablet, TAKE 1 TABLET BY MOUTH DAILY., Disp: 30 tablet, Rfl: 3  •  furosemide (Lasix) 20 MG tablet, Take 1 tablet  "by mouth Daily As Needed (Swelling)., Disp: 30 tablet, Rfl: 5  •  gabapentin (NEURONTIN) 300 MG capsule, 1 CAP EACH MORNING AND AFTERNOON, AND 2 CAPS AT BEDTIME., Disp: 360 capsule, Rfl: 0  •  loratadine (CLARITIN) 10 MG tablet, Take 1 tablet by mouth Daily., Disp: , Rfl:   •  LORazepam (ATIVAN) 0.5 MG tablet, TAKE 1 TABLET BY MOUTH EVERY 8 (EIGHT) HOURS AS NEEDED FOR ANXIETY., Disp: 60 tablet, Rfl: 0  •  meloxicam (MOBIC) 15 MG tablet, Take 1 tablet by mouth Daily., Disp: 30 tablet, Rfl: 5  •  pantoprazole (PROTONIX) 40 MG EC tablet, Take 1 tablet by mouth Daily., Disp: , Rfl:   •  potassium chloride (K-DUR,KLOR-CON) 10 MEQ ER tablet, Take 1 tablet by mouth 2 (Two) Times a Day., Disp: 60 tablet, Rfl: 5  •  sulfamethoxazole-trimethoprim (Bactrim DS) 800-160 MG per tablet, Take 1 tablet by mouth 2 (Two) Times a Day., Disp: 10 tablet, Rfl: 0  •  vitamin D (ERGOCALCIFEROL) 1.25 MG (94217 UT) capsule capsule, Take 1 capsule by mouth 1 (One) Time Per Week., Disp: 12 capsule, Rfl: 3    Review of Systems    Review of Systems   Constitutional: Negative.    HENT: Negative.    Eyes: Negative.    Respiratory: Negative.    Cardiovascular: Negative.    Gastrointestinal: Negative.    Endocrine: Negative.    Genitourinary: Negative.    Musculoskeletal: Negative.    Skin: Negative.    Allergic/Immunologic: Negative.    Neurological: Negative.    Hematological: Negative.    Psychiatric/Behavioral: Negative.    All other systems reviewed and are negative.      Objective  Vitals:    05/08/23 1003   BP: 120/74   Pulse: 88   SpO2: 95%   Weight: 92.5 kg (204 lb)   Height: 170.2 cm (67\")     Body mass index is 31.95 kg/m².    Physical Exam    Physical Exam  Vitals and nursing note reviewed.   Constitutional:       General: She is not in acute distress.     Appearance: Normal appearance. She is well-developed. She is not ill-appearing, toxic-appearing or diaphoretic.   HENT:      Head: Normocephalic and atraumatic.   Eyes:      General: No " scleral icterus.        Right eye: No discharge.         Left eye: No discharge.      Extraocular Movements: Extraocular movements intact.      Conjunctiva/sclera: Conjunctivae normal.   Neck:      Vascular: No carotid bruit.   Cardiovascular:      Rate and Rhythm: Normal rate and regular rhythm.      Pulses: Normal pulses.      Heart sounds: Normal heart sounds. No murmur heard.    No friction rub. No gallop.   Pulmonary:      Effort: Pulmonary effort is normal. No respiratory distress.      Breath sounds: Normal breath sounds. No stridor. No wheezing, rhonchi or rales.   Chest:      Chest wall: No tenderness.   Abdominal:      General: Bowel sounds are normal. There is no distension.      Palpations: Abdomen is soft. There is no mass.      Tenderness: There is no abdominal tenderness. There is no guarding or rebound.      Hernia: No hernia is present.   Musculoskeletal:      Cervical back: Normal range of motion and neck supple. No rigidity. No muscular tenderness.      Right lower leg: No edema.      Left lower leg: No edema.      Comments: In wheelchair today.   Lymphadenopathy:      Cervical: No cervical adenopathy.   Skin:     General: Skin is warm and dry.      Capillary Refill: Capillary refill takes less than 2 seconds.      Coloration: Skin is not jaundiced or pale.      Findings: No bruising, erythema, lesion or rash.   Neurological:      General: No focal deficit present.      Mental Status: She is alert and oriented to person, place, and time. Mental status is at baseline.   Psychiatric:         Mood and Affect: Mood normal.         Behavior: Behavior normal.         Thought Content: Thought content normal.         Judgment: Judgment normal.             Diagnoses and all orders for this visit:    1. Medicare annual wellness visit, subsequent (Primary)  -     CBC w AUTO Differential; Future  -     Comprehensive metabolic panel; Future  -     TSH  -     Lipid Panel With LDL/HDL Ratio; Future  -      Hemoglobin A1c  -     Urinalysis With Culture If Indicated -; Future    2. Bilateral pulmonary embolism  Comments:  S/P embolectomy. No longer on blood thinner      3. Thoracic aortic aneurysm without rupture, unspecified part    4. Mixed hyperlipidemia  -     Comprehensive metabolic panel; Future  -     Lipid Panel With LDL/HDL Ratio; Future    5. Peripheral polyneuropathy    6. Generalized anxiety disorder    7. Major depressive disorder, recurrent, in full remission    8. Vitamin D deficiency  -     Vitamin D 25 Hydroxy; Future    9. Primary osteoarthritis involving multiple joints    10. High risk medication use  -     ToxASSURE Select 13 (MW) - Urine, Clean Catch; Future    11. Bilateral lower extremity edema    12. At moderate risk for fall    13. Obesity with body mass index (BMI) of 30.0 to 39.9         Patient in for annual wellness visit with labs.  Will return fasting to have routine labs drawn as above.  We will notify patient of these results once they are available.  She is to continue her current medications.  She does not know if she needs refills or not today.  She will check when she gets home and call us back if she needs refills.  She is to continue the gabapentin for her neuropathy and Ativan as needed for the anxiety. Patient understands the risks associated with this controlled medication, including tolerance and addiction.  she also agrees to only obtain this medication from me, and not from a another provider, unless that provider is covering for me in my absence.  she also agrees to be compliant in dosing, and not self adjust the dose of medication.  A signed controlled substance agreement is on file, and she has received a controlled substance education sheet at this a previous visit.  she has also signed a consent for treatment with a controlled substance as per [facility] policy. KESHAWN was obtained.  She is counseled on needed immunizations today with Shingrix.  She declines this  due to the first immunization making her very sick.  She is to follow-up in 3 months or sooner if needed for recheck on her peripheral neuropathy and POOJA.  Answered all questions.  Patient verbalized understanding of plan of care.          This document has been electronically signed by AFSANEH Stafford on May 8, 2023 10:41 CDT

## 2023-08-08 ENCOUNTER — OFFICE VISIT (OUTPATIENT)
Dept: FAMILY MEDICINE CLINIC | Facility: CLINIC | Age: 78
End: 2023-08-08
Payer: MEDICARE

## 2023-08-08 ENCOUNTER — LAB (OUTPATIENT)
Dept: LAB | Facility: OTHER | Age: 78
End: 2023-08-08
Payer: MEDICARE

## 2023-08-08 VITALS
WEIGHT: 198.2 LBS | DIASTOLIC BLOOD PRESSURE: 76 MMHG | HEART RATE: 63 BPM | OXYGEN SATURATION: 95 % | HEIGHT: 67 IN | BODY MASS INDEX: 31.11 KG/M2 | SYSTOLIC BLOOD PRESSURE: 122 MMHG

## 2023-08-08 DIAGNOSIS — G62.9 PERIPHERAL POLYNEUROPATHY: Chronic | ICD-10-CM

## 2023-08-08 DIAGNOSIS — F41.1 GENERALIZED ANXIETY DISORDER: Chronic | ICD-10-CM

## 2023-08-08 DIAGNOSIS — Z00.00 MEDICARE ANNUAL WELLNESS VISIT, SUBSEQUENT: ICD-10-CM

## 2023-08-08 DIAGNOSIS — Z79.899 HIGH RISK MEDICATION USE: Chronic | ICD-10-CM

## 2023-08-08 DIAGNOSIS — G62.9 PERIPHERAL POLYNEUROPATHY: Primary | Chronic | ICD-10-CM

## 2023-08-08 DIAGNOSIS — L98.9 SKIN LESION: ICD-10-CM

## 2023-08-08 LAB
BACTERIA UR QL AUTO: ABNORMAL /HPF
BILIRUB UR QL STRIP: NEGATIVE
CLARITY UR: ABNORMAL
COLOR UR: YELLOW
GLUCOSE UR STRIP-MCNC: NEGATIVE MG/DL
HGB UR QL STRIP.AUTO: NEGATIVE
HYALINE CASTS UR QL AUTO: ABNORMAL /LPF
KETONES UR QL STRIP: NEGATIVE
LEUKOCYTE ESTERASE UR QL STRIP.AUTO: ABNORMAL
MUCOUS THREADS URNS QL MICRO: ABNORMAL /HPF
NITRITE UR QL STRIP: NEGATIVE
PH UR STRIP.AUTO: 7 [PH] (ref 5.5–8)
PROT UR QL STRIP: NEGATIVE
RBC # UR STRIP: ABNORMAL /HPF
REF LAB TEST METHOD: ABNORMAL
SP GR UR STRIP: 1.02 (ref 1–1.03)
SQUAMOUS #/AREA URNS HPF: ABNORMAL /HPF
UROBILINOGEN UR QL STRIP: ABNORMAL
WBC # UR STRIP: ABNORMAL /HPF

## 2023-08-08 PROCEDURE — 87086 URINE CULTURE/COLONY COUNT: CPT | Performed by: NURSE PRACTITIONER

## 2023-08-08 PROCEDURE — G0481 DRUG TEST DEF 8-14 CLASSES: HCPCS | Performed by: NURSE PRACTITIONER

## 2023-08-08 PROCEDURE — 1160F RVW MEDS BY RX/DR IN RCRD: CPT | Performed by: NURSE PRACTITIONER

## 2023-08-08 PROCEDURE — 99214 OFFICE O/P EST MOD 30 MIN: CPT | Performed by: NURSE PRACTITIONER

## 2023-08-08 PROCEDURE — 81001 URINALYSIS AUTO W/SCOPE: CPT | Performed by: NURSE PRACTITIONER

## 2023-08-08 PROCEDURE — 80307 DRUG TEST PRSMV CHEM ANLYZR: CPT | Performed by: NURSE PRACTITIONER

## 2023-08-08 PROCEDURE — 1159F MED LIST DOCD IN RCRD: CPT | Performed by: NURSE PRACTITIONER

## 2023-08-08 RX ORDER — GABAPENTIN 300 MG/1
CAPSULE ORAL
Qty: 360 CAPSULE | Refills: 0 | Status: SHIPPED | OUTPATIENT
Start: 2023-08-08

## 2023-08-08 RX ORDER — LORAZEPAM 0.5 MG/1
0.5 TABLET ORAL EVERY 8 HOURS PRN
Qty: 60 TABLET | Refills: 0 | Status: SHIPPED | OUTPATIENT
Start: 2023-08-08

## 2023-08-08 RX ORDER — GABAPENTIN 300 MG/1
CAPSULE ORAL
Qty: 360 CAPSULE | Refills: 0 | Status: SHIPPED | OUTPATIENT
Start: 2023-08-08 | End: 2023-08-08 | Stop reason: SDUPTHER

## 2023-08-08 NOTE — PROGRESS NOTES
CC: Follow-up (3 month FU, place on leg )      Subjective:  Eusebia Braden is a 77 y.o. female who presents for         Patient presents to office for 3-month follow-up on neuropathy and anxiety.  For the neuropathy she takes gabapentin 300 mg 1 in the morning and 2 at bedtime.  This does help with her neuropathy.  She has anxiety that she takes Ativan as needed for.  This does help with her anxiety.    He also complains of a place on her leg. Noticed about a week ago. It does itch on occasion. It is on the RLE.      The following portions of the patient's history were reviewed and updated as appropriate: allergies, current medications, past family history, past medical history, past social history, past surgical history and problem list.    Past Medical History:   Diagnosis Date    Acute hypoxemic respiratory failure due to COVID-19 5/7/2022    Allergic     Back pain     Dyspnea          Headache     History of bone density study 2005    Hyperlipidemia     Osteoarthritis     Peripheral neuropathy          Current Outpatient Medications:     acetaminophen (TYLENOL) 500 MG tablet, Take 2 tablets by mouth Every 8 (Eight) Hours As Needed for Mild Pain., Disp: , Rfl:     aspirin 325 MG tablet, Take 1 tablet by mouth 2 (Two) Times a Day., Disp: , Rfl:     atorvastatin (LIPITOR) 20 MG tablet, TAKE 1 TABLET BY MOUTH EVERY NIGHT., Disp: 90 tablet, Rfl: 3    cephalexin (Keflex) 500 MG capsule, Take 1 capsule by mouth 3 (Three) Times a Day., Disp: 30 capsule, Rfl: 0    citalopram (CeleXA) 20 MG tablet, TAKE 1 TABLET BY MOUTH DAILY., Disp: 30 tablet, Rfl: 3    furosemide (Lasix) 20 MG tablet, Take 1 tablet by mouth Daily As Needed (Swelling)., Disp: 30 tablet, Rfl: 5    gabapentin (NEURONTIN) 300 MG capsule, Take 1 each morning and 2 at bedtime., Disp: 360 capsule, Rfl: 0    loratadine (CLARITIN) 10 MG tablet, Take 1 tablet by mouth Daily., Disp: , Rfl:     LORazepam (ATIVAN) 0.5 MG tablet, Take 1 tablet by mouth Every 8  "(Eight) Hours As Needed for Anxiety., Disp: 60 tablet, Rfl: 0    meloxicam (MOBIC) 15 MG tablet, Take 1 tablet by mouth Daily., Disp: 30 tablet, Rfl: 5    potassium chloride (K-DUR,KLOR-CON) 10 MEQ ER tablet, Take 1 tablet by mouth 2 (Two) Times a Day., Disp: 60 tablet, Rfl: 5    sulfamethoxazole-trimethoprim (Bactrim DS) 800-160 MG per tablet, Take 1 tablet by mouth 2 (Two) Times a Day., Disp: 10 tablet, Rfl: 0    vitamin D (ERGOCALCIFEROL) 1.25 MG (23511 UT) capsule capsule, Take 1 capsule by mouth 1 (One) Time Per Week., Disp: 12 capsule, Rfl: 3    triamcinolone (KENALOG) 0.1 % ointment, Apply 1 application  topically to the appropriate area as directed 2 (Two) Times a Day., Disp: 80 g, Rfl: 1    Review of Systems    Review of Systems   Constitutional: Negative.    HENT: Negative.     Eyes: Negative.    Respiratory: Negative.     Cardiovascular: Negative.    Gastrointestinal: Negative.    Endocrine: Negative.    Genitourinary: Negative.    Musculoskeletal: Negative.    Skin:         Place on RLE   Allergic/Immunologic: Negative.    Neurological: Negative.    Hematological: Negative.    Psychiatric/Behavioral: Negative.     All other systems reviewed and are negative.    Objective  Vitals:    08/08/23 1100   BP: 122/76   Pulse: 63   SpO2: 95%   Weight: 89.9 kg (198 lb 3.2 oz)   Height: 170.2 cm (67\")     Body mass index is 31.04 kg/mý.    Physical Exam    Physical Exam  Vitals and nursing note reviewed.   Constitutional:       General: She is not in acute distress.     Appearance: Normal appearance. She is not ill-appearing, toxic-appearing or diaphoretic.   HENT:      Head: Normocephalic and atraumatic.   Cardiovascular:      Rate and Rhythm: Normal rate and regular rhythm.      Pulses: Normal pulses.      Heart sounds: Normal heart sounds. No murmur heard.    No friction rub. No gallop.   Pulmonary:      Effort: Pulmonary effort is normal. No respiratory distress.      Breath sounds: Normal breath sounds. No " stridor. No wheezing, rhonchi or rales.   Chest:      Chest wall: No tenderness.   Musculoskeletal:      Cervical back: Normal range of motion and neck supple.   Skin:     Comments: Round papular area noted to RLE. It is flesh colored.    Neurological:      Mental Status: She is alert.           Diagnoses and all orders for this visit:    1. Peripheral polyneuropathy (Primary)  -     gabapentin (NEURONTIN) 300 MG capsule; Take 1 each morning and 2 at bedtime.  Dispense: 360 capsule; Refill: 0    2. Generalized anxiety disorder  -     LORazepam (ATIVAN) 0.5 MG tablet; Take 1 tablet by mouth Every 8 (Eight) Hours As Needed for Anxiety.  Dispense: 60 tablet; Refill: 0    3. Skin lesion  -     triamcinolone (KENALOG) 0.1 % ointment; Apply 1 application  topically to the appropriate area as directed 2 (Two) Times a Day.  Dispense: 80 g; Refill: 1         Patient in for 3-month controlled follow-up on neuropathy and POOJA.  I have refilled her Neurontin and Ativan. Patient understands the risks associated with this controlled medication, including tolerance and addiction.  she also agrees to only obtain this medication from me, and not from a another provider, unless that provider is covering for me in my absence.  she also agrees to be compliant in dosing, and not self adjust the dose of medication.  A signed controlled substance agreement is on file, and she has received a controlled substance education sheet at this a previous visit.  she has also signed a consent for treatment with a controlled substance as per [facility] policy. KESHAWN was obtained.  For the skin lesion on the right lower extremity we will treat with Kenalog ointment twice daily.  She is to follow-up if this does not improve or gets worse and we will send her to dermatology for a biopsy.  She is advised to follow-up in 3 months or sooner if needed for recheck on her neuropathy and POOJA.  Answered all questions.  Patient verbalized understanding of  plan of care.        This document has been electronically signed by AFSANEH Stafford on August 8, 2023 11:14 CDT

## 2023-08-09 LAB — BACTERIA SPEC AEROBE CULT: NO GROWTH

## 2023-08-10 RX ORDER — NITROFURANTOIN 25; 75 MG/1; MG/1
100 CAPSULE ORAL 2 TIMES DAILY
Qty: 14 CAPSULE | Refills: 0 | Status: SHIPPED | OUTPATIENT
Start: 2023-08-10

## 2023-08-15 LAB — DRUGS UR: NORMAL

## 2023-09-25 DIAGNOSIS — E78.2 MIXED HYPERLIPIDEMIA: Chronic | ICD-10-CM

## 2023-09-25 RX ORDER — ATORVASTATIN CALCIUM 20 MG/1
20 TABLET, FILM COATED ORAL NIGHTLY
Qty: 90 TABLET | Refills: 3 | Status: SHIPPED | OUTPATIENT
Start: 2023-09-25
